# Patient Record
Sex: MALE | Race: WHITE | NOT HISPANIC OR LATINO | Employment: FULL TIME | URBAN - METROPOLITAN AREA
[De-identification: names, ages, dates, MRNs, and addresses within clinical notes are randomized per-mention and may not be internally consistent; named-entity substitution may affect disease eponyms.]

---

## 2019-04-03 ENCOUNTER — NON-PROVIDER VISIT (OUTPATIENT)
Dept: URGENT CARE | Facility: PHYSICIAN GROUP | Age: 54
End: 2019-04-03

## 2019-04-03 DIAGNOSIS — Z02.1 PRE-EMPLOYMENT DRUG SCREENING: ICD-10-CM

## 2019-04-03 LAB
AMP AMPHETAMINE: NORMAL
BAR BARBITURATES: NORMAL
BZO BENZODIAZEPINES: NORMAL
COC COCAINE: NORMAL
INT CON NEG: NORMAL
INT CON POS: NORMAL
MDMA ECSTASY: NORMAL
MET METHAMPHETAMINES: NORMAL
MTD METHADONE: NORMAL
OPI OPIATES: NORMAL
OXY OXYCODONE: NORMAL
PCP PHENCYCLIDINE: NORMAL
POC URINE DRUG SCREEN OCDRS: NEGATIVE
THC: NORMAL

## 2019-04-03 PROCEDURE — 80305 DRUG TEST PRSMV DIR OPT OBS: CPT | Performed by: FAMILY MEDICINE

## 2019-10-07 ENCOUNTER — OCCUPATIONAL MEDICINE (OUTPATIENT)
Dept: URGENT CARE | Facility: PHYSICIAN GROUP | Age: 54
End: 2019-10-07
Payer: COMMERCIAL

## 2019-10-07 VITALS
OXYGEN SATURATION: 97 % | HEIGHT: 70 IN | WEIGHT: 185 LBS | BODY MASS INDEX: 26.48 KG/M2 | TEMPERATURE: 97.7 F | HEART RATE: 82 BPM | DIASTOLIC BLOOD PRESSURE: 78 MMHG | SYSTOLIC BLOOD PRESSURE: 122 MMHG

## 2019-10-07 DIAGNOSIS — S39.012A STRAIN OF LUMBAR REGION, INITIAL ENCOUNTER: ICD-10-CM

## 2019-10-07 DIAGNOSIS — Z02.1 PRE-EMPLOYMENT DRUG SCREENING: ICD-10-CM

## 2019-10-07 LAB
AMP AMPHETAMINE: NORMAL
BAR BARBITURATES: NORMAL
BZO BENZODIAZEPINES: NORMAL
COC COCAINE: NORMAL
INT CON NEG: NEGATIVE
INT CON POS: POSITIVE
MDMA ECSTASY: NORMAL
MET METHAMPHETAMINES: NORMAL
MTD METHADONE: NORMAL
OPI OPIATES: NORMAL
OXY OXYCODONE: NORMAL
PCP PHENCYCLIDINE: NORMAL
POC URINE DRUG SCREEN OCDRS: NEGATIVE
THC: NORMAL

## 2019-10-07 PROCEDURE — 80305 DRUG TEST PRSMV DIR OPT OBS: CPT | Mod: 29 | Performed by: PHYSICIAN ASSISTANT

## 2019-10-07 PROCEDURE — 99203 OFFICE O/P NEW LOW 30 MIN: CPT | Mod: 29 | Performed by: PHYSICIAN ASSISTANT

## 2019-10-07 RX ORDER — METHYLPREDNISOLONE 4 MG/1
4 TABLET ORAL DAILY
Qty: 1 KIT | Refills: 0 | Status: ON HOLD | OUTPATIENT
Start: 2019-10-07 | End: 2020-05-26

## 2019-10-07 RX ORDER — CYCLOBENZAPRINE HCL 5 MG
5-10 TABLET ORAL
Qty: 10 TAB | Refills: 0 | Status: SHIPPED | OUTPATIENT
Start: 2019-10-07 | End: 2019-10-17

## 2019-10-07 ASSESSMENT — ENCOUNTER SYMPTOMS
TINGLING: 0
NECK PAIN: 0
FALLS: 0
MYALGIAS: 1
ABDOMINAL PAIN: 0
DOUBLE VISION: 0
BLURRED VISION: 0
HEADACHES: 0
LOSS OF CONSCIOUSNESS: 0
SENSORY CHANGE: 0
FOCAL WEAKNESS: 0
BACK PAIN: 1

## 2019-10-07 ASSESSMENT — PAIN SCALES - GENERAL: PAINLEVEL: 7=MODERATE-SEVERE PAIN

## 2019-10-07 NOTE — LETTER
"EMPLOYEE’S CLAIM FOR COMPENSATION/ REPORT OF INITIAL TREATMENT  FORM C-4    EMPLOYEE’S CLAIM - PROVIDE ALL INFORMATION REQUESTED   First Name  Devaughn Last Name  Perry Birthdate                    1965                Sex  male Claim Number   Home Address  [address] Age  54 y.o. Height  1.778 m (5' 10\") Weight  83.9 kg (185 lb) [facility]     [facility]  [address] Telephone  [phone] (home)    Mailing Address  [address] [facility] [place]  [address] Primary Language Spoken  English    Insurer Third Party   [employer]   Employee's Occupation (Job Title) When Injury or Occupational Disease Occurred      Employer's Name  [employer] Telephone  [phone]   Employer Address  [address]   [address]   Date of Injury  10/7/2019               Hour of Injury  7:05 AM Date Employer Notified  10/7/2019 Last Day of Work after Injury or Occupational Disease  10/7/2019 Supervisor to Whom Injury Reported  Yayo   Address or Location of Accident (if applicable)  [[address]; [place] [address]]   What were you doing at the time of accident? (if applicable)  Stacking waste cardboard    How did this injury or occupational disease occur? (Be specific an answer in detail. Use additional sheet if necessary)  For 45 minutes I had been stacking cardboard for disposal. I bend to  a piece, walked a few yards to stack it and bend to place it. This time a sharp pain occured in my lower back.   If you believe that you have an occupational disease, when did you first have knowledge of the disability and it relationship to your employment?  N/A Witnesses to the Accident  N/A      Nature of Injury or Occupational Disease  Workers' Compensation  Part(s) of Body Injured or Affected  Lower Back Area (Lumbar Area & Lumbo-Sacral), N/A, N/A    I certify that the above is " true and correct to the best of my knowledge and that I have provided this information in order to obtain the benefits of [place]  I hereby authorize any physician, chiropractor, surgeon, practitioner, or other person, any hospital, including [facility] or [facility], any medical service organization, any insurance company, or other institution or organization to release to each other, any medical or other information, including benefits paid or payable, pertinent to this injury or disease, except information relative to diagnosis, treatment and/or counseling for AIDS, psychological conditions, alcohol or controlled substances, for which I must give specific authorization.  A Photostat of this authorization shall be as valid as the original.     Date   Place   Employee’s Signature   THIS REPORT MUST BE COMPLETED AND MAILED WITHIN 3 WORKING DAYS OF TREATMENT   Place  [facility]  Name of Facility  [place]   Date  10/7/2019 Diagnosis  (S39.012A) Strain of lumbar region, initial encounter  (Z02.1) Pre-employment drug screening Is there evidence the injured employee was under the influence of alcohol and/or another controlled substance at the time of accident?   Hour  9:10 AM Description of Injury or Disease  Diagnoses of Strain of lumbar region, initial encounter and Pre-employment drug screening were pertinent to this visit. No   Treatment  Recommend rest, apply heat to affected area and perform gentle stretching as tolerated with pain.  Prescription for Flexeril and Medrol Dosepak given.  Flexeril is to be taken as needed before bed or not within 8 hours of operating a vehicle.  Do not take other NSAIDs while taking Medrol Dosepak.  Return for follow-up in 4 days, patient will schedule appointment on Friday, 10/11/2019.  Have you advised the patient to remain off work five days or  "more? No   X-Ray Findings      If Yes   From Date  To Date      From information given by the employee, together with medical evidence, can you directly connect this injury or occupational disease as job incurred?  Yes If No Full Duty  No Modified Duty  Yes   Is additional medical care by a physician indicated?  Yes If Modified Duty, Specify any Limitations / Restrictions  Avoid squatting, bending, pushing, pulling and reaching above head until follow-up appointment.  Recommend patient does not operate a vehicle for work.   Do you know of any previous injury or disease contributing to this condition or occupational disease?                                Date  10/7/2019 Print Doctor’s Name Natalie Cintron P.A.-C. I certify the employer’s copy of  this form was mailed on:   Address  [address] Insurer’s Use Only     [facility]  [address]    Provider’s Tax ID Number  [id number] Telephone  Dept: [phone]        e-SignMORRNATALIE SHARP P.A.-C.   e-Signature: Dr. Elías Lewis, Medical Director Degree  MD        ORIGINAL-TREATING PHYSICIAN OR CHIROPRACTOR    PAGE 2-INSURER/TPA    PAGE 3-EMPLOYER    PAGE 4-EMPLOYEE             Form C-4 (rev.10/07)              BRIEF DESCRIPTION OF RIGHTS AND BENEFITS  (Pursuant to [place] 616C.050)    Notice of Injury or Occupational Disease (Incident Report Form C-1): If an injury or occupational disease (OD) arises out of and in the course of employment, you must provide written notice to your employer as soon as practicable, but no later than 7 days after the accident or OD. Your employer shall maintain a sufficient supply of the required forms.    Claim for Compensation (Form C-4): If medical treatment is sought, the form C-4 is available at the place of initial treatment. A completed \"Claim for Compensation\" (Form C-4) must be filed within 90 days after an accident or OD. The treating physician or chiropractor must, within 3 working days after treatment, " complete and mail to the employer, the employer's insurer and third-party , the Claim for Compensation.    Medical Treatment: If you require medical treatment for your on-the-job injury or OD, you may be required to select a physician or chiropractor from a list provided by your workers’ compensation insurer, if it has contracted with an Organization for Managed Care (MCO) or Preferred Provider Organization (PPO) or providers of health care. If your employer has not entered into a contract with an MCO or PPO, you may select a physician or chiropractor from the Panel of Physicians and Chiropractors. Any medical costs related to your industrial injury or OD will be paid by your insurer.    Temporary Total Disability (TTD): If your doctor has certified that you are unable to work for a period of at least 5 consecutive days, or 5 cumulative days in a 20-day period, or places restrictions on you that your employer does not accommodate, you may be entitled to TTD compensation.    Temporary Partial Disability (TPD): If the wage you receive upon reemployment is less than the compensation for TTD to which you are entitled, the insurer may be required to pay you TPD compensation to make up the difference. TPD can only be paid for a maximum of 24 months.    Permanent Partial Disability (PPD): When your medical condition is stable and there is an indication of a PPD as a result of your injury or OD, within 30 days, your insurer must arrange for an evaluation by a rating physician or chiropractor to determine the degree of your PPD. The amount of your PPD award depends on the date of injury, the results of the PPD evaluation and your age and wage.    Permanent Total Disability (PTD): If you are medically certified by a treating physician or chiropractor as permanently and totally disabled and have been granted a PTD status by your insurer, you are entitled to receive monthly benefits not to exceed 66 2/3% of your  average monthly wage. The amount of your PTD payments is subject to reduction if you previously received a PPD award.    Vocational Rehabilitation Services: You may be eligible for vocational rehabilitation services if you are unable to return to the job due to a permanent physical impairment or permanent restrictions as a result of your injury or occupational disease.    Transportation and Per Aidan Reimbursement: You may be eligible for travel expenses and per aidan associated with medical treatment.    Reopening: You may be able to reopen your claim if your condition worsens after claim closure.    Appeal Process: If you disagree with a written determination issued by the insurer or the insurer does not respond to your request, you may appeal to the Department of Administration, , by following the instructions contained in your determination letter. You must appeal the determination within 70 days from the date of the determination letter at [address], [address], [place], [place] [address], or [address], [address], [place] [address]. If you disagree with the  decision, you may appeal to the Department of Administration, . You must file your appeal within 30 days from the date of the  decision letter at [address], [address], [place], [place] [address], or [address], [address], [place] [address]. If you disagree with a decision of an , you may file a petition for judicial review with the District Court. You must do so within 30 days of the Appeal Officer’s decision. You may be represented by an  at your own expense or you may contact the [facility] for possible representation.    [place]  for Injured Workers (NAIW): If you disagree with a  decision, you may request that NAIW represent you without charge at an  Hearing. For information regarding  denial of benefits, you may contact the [facility] at: [address], [address], [place] [address], [phone], or [address], [address], [place] [address], [phone]    To File a Complaint with the Division: If you wish to file a complaint with the  of the Division of Industrial Relations (DIR),  please contact the Workers’ Compensation Section, [address], [address], [place], [place] [address], telephone (950) [phone], or [address], [address], [place], [place] [address], telephone [phone]) [phone].    For assistance with Workers’ Compensation Issues: you may contact the Office of the Governor Consumer Health Assistance, [address], [address], [place], [place] [address], Toll Free 1-198.662.2533, Web site: http://govcha.[facility].nv., E-mail [email or web address].[email or web address].                             __________________________________________________________________                                                                   _________________                Employee Name / Signature                                                                                                                                                       Date                                                                                                                                                                                                     D-2 (rev. 06/18)

## 2019-10-11 ENCOUNTER — OCCUPATIONAL MEDICINE (OUTPATIENT)
Dept: URGENT CARE | Facility: PHYSICIAN GROUP | Age: 54
End: 2019-10-11
Payer: COMMERCIAL

## 2019-10-11 VITALS
TEMPERATURE: 97.8 F | DIASTOLIC BLOOD PRESSURE: 80 MMHG | HEIGHT: 70 IN | RESPIRATION RATE: 15 BRPM | SYSTOLIC BLOOD PRESSURE: 124 MMHG | WEIGHT: 185 LBS | BODY MASS INDEX: 26.48 KG/M2 | HEART RATE: 78 BPM | OXYGEN SATURATION: 98 %

## 2019-10-11 DIAGNOSIS — S39.012D STRAIN OF LUMBAR REGION, SUBSEQUENT ENCOUNTER: ICD-10-CM

## 2019-10-11 PROCEDURE — 99213 OFFICE O/P EST LOW 20 MIN: CPT | Performed by: FAMILY MEDICINE

## 2019-10-11 NOTE — LETTER
[facility]  [address]  [place] [address]  Phone:  [phone] - Fax:  [phone]   [facility] Progress Report and Disability Certification  Date of Service: 10/11/2019   No Show:  No  Date / Time of Next Visit: 10/16/2019   Claim Information   Patient Name: Devaughn Amador  Claim Number:     Employer:    Date of Injury: 10/7/2019     Insurer / TPA: [employer]  ID / SSN:     Occupation:   Diagnosis: The encounter diagnosis was Strain of lumbar region, subsequent encounter.    Medical Information   Related to Industrial Injury? Yes    Subjective Complaints:  DOI: 10/7/2019  F/u visit lumbar strain. He is improving at least 50% improved with muscle relaxer and steroid. Pain severity 5/10 currently. +radiation to left leg. No myelopathy. No fever. No PMH cancer, no unwanted weight loss. Worse with bending. No other aggravating or alleviating factors.     Objective Findings: Back: tender left paralumbar musculature and soft tissue, pain reproduced with flexion at 45 degrees. No midline bony tenderness or stepoff  Neuro: Bilateral lower extremity strength and sensory intact.  Negative straight leg raise. DTR 3+/4 bilateral knees   Pre-Existing Condition(s):     Assessment:   Condition Improved    Status: Additional Care Required  Permanent Disability:No    Plan:   Comments:advance duty restrictions slightly, ice, nsaid    Diagnostics:      Comments:       Disability Information   Status: Released to Restricted Duty    From:  10/11/2019  Through: 10/16/2019 Restrictions are: Temporary   Physical Restrictions   Sitting:    Standing:    Stooping:  < or = to 1 hr/day Bending:  < or = to 1 hr/day   Squatting:    Walking:    Climbing:    Pushing:  < or = to 1 hr/day   Pulling:  < or = to 1 hr/day Other:    Reaching Above Shoulder (L):   Reaching Above Shoulder (R):       Reaching Below Shoulder (L):    Reaching Below Shoulder (R):      Not to exceed Weight  Limits   Carrying(hrs): 1 Weight Limit(lb): < or = to 10 pounds Lifting(hrs): 1 Weight  Limit(lb): < or = to 10 pounds   Comments:      Repetitive Actions   Hands: i.e. Fine Manipulations from Grasping:     Feet: i.e. Operating Foot Controls:     Driving / Operate Machinery:     Physician Name: Jreemias Fernandez M.D. Physician Signature: JEREMIAS Holliday M.D. e-Signature: Dr. Elías Lewis, Medical Director   Clinic Name / Location: [address] [address] Clinic Phone Number: Dept: [phone]   Appointment Time: 2:00 Pm Visit Start Time: 1:54 PM   Check-In Time:  1:36 Pm Visit Discharge Time:  2:20 PM   Original-Treating Physician or Chiropractor    Page 2-Insurer/TPA    Page 3-Employer    Page 4-Employee

## 2019-10-16 ENCOUNTER — OCCUPATIONAL MEDICINE (OUTPATIENT)
Dept: URGENT CARE | Facility: PHYSICIAN GROUP | Age: 54
End: 2019-10-16
Payer: COMMERCIAL

## 2019-10-16 VITALS
DIASTOLIC BLOOD PRESSURE: 80 MMHG | SYSTOLIC BLOOD PRESSURE: 122 MMHG | WEIGHT: 180 LBS | RESPIRATION RATE: 12 BRPM | BODY MASS INDEX: 25.77 KG/M2 | HEART RATE: 90 BPM | HEIGHT: 70 IN | TEMPERATURE: 99.4 F | OXYGEN SATURATION: 95 %

## 2019-10-16 DIAGNOSIS — S39.012A STRAIN OF LUMBAR REGION, INITIAL ENCOUNTER: ICD-10-CM

## 2019-10-16 PROCEDURE — 99213 OFFICE O/P EST LOW 20 MIN: CPT | Performed by: FAMILY MEDICINE

## 2019-10-16 NOTE — LETTER
"EMPLOYEE’S CLAIM FOR COMPENSATION/ REPORT OF INITIAL TREATMENT  FORM C-4    EMPLOYEE’S CLAIM - PROVIDE ALL INFORMATION REQUESTED   First Name  Devaughn Last Name  Perry Birthdate                    1965                Sex  male Claim Number   Home Address  [address] Age  54 y.o. Height  1.778 m (5' 10\") Weight  81.6 kg (180 lb) [facility]     [facility] Zip  [address] Telephone  [phone] (home)    Mailing Address  [address] [facility] Zip  [address] Primary Language Spoken  English    Insurer  [employer] Third Party   [employer]   Employee's Occupation (Job Title) When Injury or Occupational Disease Occurred      Employer's Name  [employer]  Telephone   [phone]   Employer Address   [address]  [facility]   [address]   Date of Injury  10/7/2019               Hour of Injury  7:05 AM Date Employer Notified  10/7/2019 Last Day of Work after Injury or Occupational Disease  10/7/2019 Supervisor to Whom Injury Reported  Yayo   Address or Location of Accident (if applicable)  [[address]; [place] [address]]   What were you doing at the time of accident? (if applicable)  Stacking waste cardboard    How did this injury or occupational disease occur? (Be specific an answer in detail. Use additional sheet if necessary)  For 45 minutes I had been stacking cardboard for disposal. I bend to  a piece, walked a few yards to stack it and bend to place it. This time a sharp pain occured in my lower back.   If you believe that you have an occupational disease, when did you first have knowledge of the disability and it relationship to your employment?  N/A Witnesses to the Accident  N/A      Nature of Injury or Occupational Disease  Workers' Compensation  Part(s) of Body Injured or Affected  Lower Back Area (Lumbar Area & Lumbo-Sacral), N/A, N/A    I " certify that the above is true and correct to the best of my knowledge and that I have provided this information in order to obtain the benefits of [place]  I hereby authorize any physician, chiropractor, surgeon, practitioner, or other person, any hospital, including [facility] or [facility], any medical service organization, any insurance company, or other institution or organization to release to each other, any medical or other information, including benefits paid or payable, pertinent to this injury or disease, except information relative to diagnosis, treatment and/or counseling for AIDS, psychological conditions, alcohol or controlled substances, for which I must give specific authorization.  A Photostat of this authorization shall be as valid as the original.     Date   Place   Employee’s Signature   THIS REPORT MUST BE COMPLETED AND MAILED WITHIN 3 WORKING DAYS OF TREATMENT   Place  [facility]  Name of Facility  [place]   Date  10/16/2019 Diagnosis  (S39.012A) Strain of lumbar region, initial encounter Is there evidence the injured employee was under the influence of alcohol and/or another controlled substance at the time of accident?   Hour  2:15 PM Description of Injury or Disease  The encounter diagnosis was Strain of lumbar region, initial encounter.     Treatment     Have you advised the patient to remain off work five days or more?     X-Ray Findings      If Yes   From Date  To Date      From information given by the employee, together with medical evidence, can you directly connect this injury or occupational disease as job incurred?    If No Full Duty    Modified Duty      Is additional medical care by a physician indicated?    If Modified Duty, Specify any Limitations / Restrictions      Do you know of any previous injury or disease contributing to this  "condition or occupational disease?                                Date  10/16/2019 Print Doctor’s Name Everette Reardon M.D. I certify the employer’s copy of  this form was mailed on:   Address  [address] Insurer’s Use Only     [facility] Zip  [address]    Provider’s Tax ID Number  [id number] Telephone  Dept: [phone]        e-EVERETTE Hong M.D.   e-Signature: Dr. Elías Lewis, Medical Director Degree  MD        ORIGINAL-TREATING PHYSICIAN OR CHIROPRACTOR    PAGE 2-INSURER/TPA    PAGE 3-EMPLOYER    PAGE 4-EMPLOYEE             Form C-4 (rev.10/07)              BRIEF DESCRIPTION OF RIGHTS AND BENEFITS  (Pursuant to NRS 616C.050)    Notice of Injury or Occupational Disease (Incident Report Form C-1): If an injury or occupational disease (OD) arises out of and in the course of employment, you must provide written notice to your employer as soon as practicable, but no later than 7 days after the accident or OD. Your employer shall maintain a sufficient supply of the required forms.    Claim for Compensation (Form C-4): If medical treatment is sought, the form C-4 is available at the place of initial treatment. A completed \"Claim for Compensation\" (Form C-4) must be filed within 90 days after an accident or OD. The treating physician or chiropractor must, within 3 working days after treatment, complete and mail to the employer, the employer's insurer and third-party , the Claim for Compensation.    Medical Treatment: If you require medical treatment for your on-the-job injury or OD, you may be required to select a physician or chiropractor from a list provided by your workers’ compensation insurer, if it has contracted with an Organization for Managed Care (MCO) or Preferred Provider Organization (PPO) or providers of health care. If your employer has not entered into a contract with an MCO or PPO, you may select a physician or chiropractor from the Panel of Physicians and " Chiropractors. Any medical costs related to your industrial injury or OD will be paid by your insurer.    Temporary Total Disability (TTD): If your doctor has certified that you are unable to work for a period of at least 5 consecutive days, or 5 cumulative days in a 20-day period, or places restrictions on you that your employer does not accommodate, you may be entitled to TTD compensation.    Temporary Partial Disability (TPD): If the wage you receive upon reemployment is less than the compensation for TTD to which you are entitled, the insurer may be required to pay you TPD compensation to make up the difference. TPD can only be paid for a maximum of 24 months.    Permanent Partial Disability (PPD): When your medical condition is stable and there is an indication of a PPD as a result of your injury or OD, within 30 days, your insurer must arrange for an evaluation by a rating physician or chiropractor to determine the degree of your PPD. The amount of your PPD award depends on the date of injury, the results of the PPD evaluation and your age and wage.    Permanent Total Disability (PTD): If you are medically certified by a treating physician or chiropractor as permanently and totally disabled and have been granted a PTD status by your insurer, you are entitled to receive monthly benefits not to exceed 66 2/3% of your average monthly wage. The amount of your PTD payments is subject to reduction if you previously received a PPD award.    Vocational Rehabilitation Services: You may be eligible for vocational rehabilitation services if you are unable to return to the job due to a permanent physical impairment or permanent restrictions as a result of your injury or occupational disease.    Transportation and Per Aidan Reimbursement: You may be eligible for travel expenses and per aidan associated with medical treatment.    Reopening: You may be able to reopen your claim if your condition worsens after claim  closure.    Appeal Process: If you disagree with a written determination issued by the insurer or the insurer does not respond to your request, you may appeal to the Department of Administration, , by following the instructions contained in your determination letter. You must appeal the determination within 70 days from the date of the determination letter at [address], [address], [place], [place] [address], or [address], [address], [place], [place] [address]. If you disagree with the  decision, you may appeal to the Department of Administration, . You must file your appeal within 30 days from the date of the  decision letter at [address], [address], [place], [place] [address], or [address], [address], [place], [place] [address]. If you disagree with a decision of an , you may file a petition for judicial review with the District Court. You must do so within 30 days of the Appeal Officer’s decision. You may be represented by an  at your own expense or you may contact the [facility] for possible representation.    [place]  for Injured Workers (NAIW): If you disagree with a  decision, you may request that NAIW represent you without charge at an  Hearing. For information regarding denial of benefits, you may contact the [facility] at: [address], [address], [place] [address], [phone], or [address], [address], [place] [address], [phone]    To File a Complaint with the Division: If you wish to file a complaint with the  of the Division of Industrial Relations (DIR),  please contact the Workers’ Compensation Section, [address], [address], [place], [place] [address], telephone (890) [phone], or [address], [place], [place] [address], telephone [phone].    For assistance with Workers’  Compensation Issues: You may contact the Office of the Governor Consumer Health Assistance, [facility], [address], [place] [address], Toll Free 1-260.835.3242, Web site: http://govcha.[email or web address].[email or web address]., E-mail [email or web address].                             __________________________________________________________________                                                                   _________________                Employee Name / Signature                                                                                                                                                       Date                                                                                                                                                                                                     D-2 (rev. 06/18)

## 2019-10-16 NOTE — LETTER
[facility]  [address]  [place] [address]  Phone:  [phone] - Fax:  [phone]   [facility] Progress Report and Disability Certification  Date of Service: 10/16/2019   No Show:  No  Date / Time of Next Visit: 10/23/2019   Claim Information   Patient Name: Devaughn Amador  Claim Number:     Employer: [employer] Date of Injury: 10/7/2019     Insurer / TPA: [employer]  ID / SSN:     Occupation:   Diagnosis: The encounter diagnosis was Strain of lumbar region, initial encounter.    Medical Information   Related to Industrial Injury? 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   He continues to have the lower back pain, but now radiating down rt leg.   Mild improvement with motrin     . Pertinent negatives include no bladder incontinence, bowel incontinence, dysuria, fever, headaches,  or weakness.     Objective Findings:      Lumbar spine: pt exhibits decreased range of motion, spasm and right sided tenderness . Pt exhibits no bony tenderness, no swelling, no edema, no deformity  .   Neurological: patient is alert and oriented to person, place, and time. Patient has normal reflexes. No cranial nerve deficit. Patient exhibits normal muscle tone.      STRAIGHT LEG RAISE NEGATIVE ON LEFT/RIGHT   Pre-Existing Condition(s):     Assessment:   Condition Worsened    Status: Additional Care Required  Permanent Disability:No    Plan:      Diagnostics:      Comments:       Disability Information   Status: Released to Restricted Duty    From:  10/16/2019  Through: 10/23/2019 Restrictions are: Temporary   Physical Restrictions   Sitting:    Standing:    Stoopin hrs/day Bendin hrs/day   Squatting:    Walking:    Climbing:    Pushing:      Pulling:    Other:    Reaching Above Shoulder (L):   Reaching Above Shoulder (R):       Reaching Below  Shoulder (L):    Reaching Below Shoulder (R):      Not to exceed Weight Limits   Carrying(hrs):   Weight Limit(lb): < or = to 10 pounds Lifting(hrs):   Weight  Limit(lb): < or = to 10 pounds   Comments: Strain of lumbar region, initial encounter  Worsening.   Now has radicular symptoms.   Restrictions per D39  F/u in occupational medicine in 2-5 d    - Diclofenac Sodium (VOLTAREN) 1 % Gel; Apply 4 g to skin as directed 3 times a day as needed.  Dispense: 1 Tube; Refill: 0      Repetitive Actions   Hands: i.e. Fine Manipulations from Grasping:     Feet: i.e. Operating Foot Controls:     Driving / Operate Machinery:     Physician Name: Everette Reardon M.D. Physician Signature: EVERETTE Garza M.D. e-Signature: Dr. Elías Lewis, Medical Director   Clinic Name / Location: [address] [address] Clinic Phone Number: Dept: [phone]   Appointment Time: 2:00 Pm Visit Start Time: 2:15 PM   Check-In Time:  1:44 Pm Visit Discharge Time:  3:19PM   Original-Treating Physician or Chiropractor    Page 2-Insurer/TPA    Page 3-Employer    Page 4-Employee

## 2019-10-16 NOTE — PROGRESS NOTES
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   He continues to have the lower back pain, but now radiating down rt leg.   Mild improvement with motrin     . Pertinent negatives include no bladder incontinence, bowel incontinence, dysuria, fever, headaches,  or weakness.       Social History     Tobacco Use   • Smoking status: Current Some Day Smoker     Types: Cigars   • Smokeless tobacco: Never Used   Substance Use Topics   • Alcohol use: Not on file   • Drug use: Not on file             Review of Systems   Constitutional: Negative for fever, chills and malaise/fatigue.   Eyes: Negative for vision changes, d/c.    Respiratory: Negative for cough and sputum production.    Cardiovascular: Negative for chest pain and palpitations.   Gastrointestinal: Negative for nausea, vomiting, abdominal pain, diarrhea and constipation.   Genitourinary: Negative for dysuria, urgency and frequency.   Skin: Negative for rash or  itching.   Neurological: Negative for dizziness and headaches.   Psychiatric/Behavioral: Negative for depression.   Hematologic/lymphatic - denies bruising or excessive bleeding  All other systems reviewed and are negative.         Objective:     /80   Pulse 90   Temp 37.4 °C (99.4 °F) (Temporal)   Resp 12   Ht 1.778 m (5' 10\")   Wt 81.6 kg (180 lb)   SpO2 95%       Physical Exam   Constitutional: pt is oriented to person, place, and time. Pt appears well-developed and well-nourished. No distress.   HENT:   Head: Normocephalic and atraumatic.   Eyes: EOM are normal. Pupils are equal, round, and reactive to light. No scleral icterus.   Neck: Normal range of motion. Neck supple. No thyromegaly present.   Cardiovascular: Normal rate, regular rhythm and normal heart sounds.    Pulmonary/Chest: Effort normal and breath sounds normal. No respiratory " distress.  no wheezes.   no rales.  no tenderness.   Musculoskeletal: pt exhibits no edema.                  Lumbar spine: pt exhibits decreased range of motion, spasm and right sided tenderness . Pt exhibits no bony tenderness, no swelling, no edema, no deformity  .   Neurological: patient is alert and oriented to person, place, and time. Patient has normal reflexes. No cranial nerve deficit. Patient exhibits normal muscle tone.      STRAIGHT LEG RAISE NEGATIVE ON LEFT/RIGHT  Skin: Skin is warm and dry. No rash noted. No erythema.   Psychiatric: patient has a normal mood and affect.  behavior is normal.   Nursing note and vitals reviewed.               Assessment/Plan:       1. Strain of lumbar region, initial encounter  Worsening.   Now has radicular symptoms.   Restrictions per D39  F/u in occupational medicine in 2-5 d    - Diclofenac Sodium (VOLTAREN) 1 % Gel; Apply 4 g to skin as directed 3 times a day as needed.  Dispense: 1 Tube; Refill: 0

## 2019-10-17 ENCOUNTER — OCCUPATIONAL MEDICINE (OUTPATIENT)
Dept: OCCUPATIONAL MEDICINE | Facility: CLINIC | Age: 54
End: 2019-10-17
Payer: COMMERCIAL

## 2019-10-17 VITALS
DIASTOLIC BLOOD PRESSURE: 78 MMHG | HEART RATE: 85 BPM | HEIGHT: 70 IN | SYSTOLIC BLOOD PRESSURE: 110 MMHG | WEIGHT: 180 LBS | BODY MASS INDEX: 25.77 KG/M2 | TEMPERATURE: 98.5 F | OXYGEN SATURATION: 95 %

## 2019-10-17 DIAGNOSIS — S39.012D STRAIN OF LUMBAR REGION, SUBSEQUENT ENCOUNTER: ICD-10-CM

## 2019-10-17 PROCEDURE — 99213 OFFICE O/P EST LOW 20 MIN: CPT | Mod: 29 | Performed by: NURSE PRACTITIONER

## 2019-10-17 RX ORDER — TIZANIDINE 4 MG/1
4 TABLET ORAL EVERY 6 HOURS PRN
Qty: 30 TAB | Refills: 0 | Status: SHIPPED | OUTPATIENT
Start: 2019-10-17 | End: 2020-06-05

## 2019-10-17 RX ORDER — IBUPROFEN 800 MG/1
800 TABLET ORAL EVERY 8 HOURS PRN
Qty: 30 TAB | Refills: 0 | Status: ON HOLD | OUTPATIENT
Start: 2019-10-17 | End: 2020-05-26

## 2019-10-17 ASSESSMENT — ENCOUNTER SYMPTOMS
MYALGIAS: 1
BACK PAIN: 1
PSYCHIATRIC NEGATIVE: 1
SENSORY CHANGE: 1
RESPIRATORY NEGATIVE: 1
TINGLING: 0
WEAKNESS: 1
CONSTITUTIONAL NEGATIVE: 1
CARDIOVASCULAR NEGATIVE: 1

## 2019-10-17 NOTE — LETTER
[address],   [address] [place] [address]  Phone:  [phone] - Fax:  [phone]   Occupational Health [facility] Progress Report and Disability Certification  Date of Service: 10/17/2019   No Show:  No  Date / Time of Next Visit: 10/31/2019 @ 1:30 AM   Claim Information   Patient Name: Devaughn Amador  Claim Number:     Employer:   [employer] Date of Injury: 10/7/2019     Insurer / TPA: [employer]  ID / SSN:     Occupation:   Diagnosis: The encounter diagnosis was Strain of lumbar region, subsequent encounter.    Medical Information   Related to Industrial Injury?   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  He states he felt a sudden sharp and shooting pain in his lower back that radiates to mid back.     Today patient states pain has gotten worse. Patient cannot sit or stand for more than 1-2 hours. Patient states that he been using heat with moderate relief. Patient is taking Ibuprofen with some relief. Patient states that he used Diclofenac gel yesterday which did nothing. Patient completed his complete Medrol Dosepak which did reduce some of the pain, but pain is now back. MRI has been ordered in the Urgent Care, states that he has severe pain. Patient has been trying to stretch his back to keep it moving. Pain is described as constant, migrating down to his leg, but not to the toes, feet, or ankle, and its stuck in the calf.  Patient denies saddle anesthesia, loss of bowel/bladder control, or tingling. Plan of care discussed with patient.   Objective Findings: Lumbar:  Moderate bony tenderness over lumbar spine region with moderate paraspinous muscular tenderness and spasm bilateral lumbar region  No bony deformity or step-off noted no radiculopathy  Unable to perform straight leg test due to pain  Patient is able to  ambulate with a cane  Obvious difficulty getting up and down from the exam secondary to pain  Distal neurovascular intact  Unable to heel/toe walk with minimal difficulty   Cranial nerves grossly intact   Achilles and patellar reflexes 2+ bilaterally   Pre-Existing Condition(s):     Assessment:   Condition Worsened    Status: Additional Care Required  Permanent Disability:No    Plan: Diagnostics    Diagnostics: MRI    Comments:  Follow-up in 2 weeks  MRI ordered in Urgent Care  Continue with stretching and gentle range of motion as tolerated  Continue with Ibuprofen and tizanidine as prescribed. DO NOT DRIVE/WORK on muscle relaxer  Continue with heat as tolerated     Disability Information   Status: Released to Restricted Duty    From:  10/17/2019  Through: 10/31/2019 Restrictions are: Temporary   Physical Restrictions   Sitting:    Standing:    Stooping:  < or = to 2 hrs/day Bending:  < or = to 2 hrs/day   Squatting:    Walking:    Climbing:    Pushing:  < or = to 2 hrs/day   Pulling:  < or = to 2 hrs/day Other:    Reaching Above Shoulder (L):   Reaching Above Shoulder (R):       Reaching Below Shoulder (L):    Reaching Below Shoulder (R):      Not to exceed Weight Limits   Carrying(hrs):   Weight Limit(lb): < or = to 10 pounds Lifting(hrs):   Weight  Limit(lb): < or = to 10 pounds   Comments:      Repetitive Actions   Hands: i.e. Fine Manipulations from Grasping:     Feet: i.e. Operating Foot Controls:     Driving / Operate Machinery: < or = to 2 hrs/day   Physician Name: DEEP Byrnes Physician Signature: CARMEN Posey e-Signature: Dr. Elías Lewis, Medical Director   Clinic Name / Location: [address],   [address]  [place] [address] Clinic Phone Number: Dept: [phone]   Appointment Time: 1:30 Pm Visit Start Time: 1:48 PM   Check-In Time:  1:46 Pm Visit Discharge Time:  3 PM   Original-Treating Physician or Chiropractor    Page  2-Insurer/TPA    Page 3-Employer    Page 4-Employee

## 2019-10-17 NOTE — PROGRESS NOTES
"Subjective:      Devaughn Amador is a 54 y.o. 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  He states he felt a sudden sharp and shooting pain in his lower back that radiates to mid back.     Today patient states pain has gotten worse. Patient cannot sit or stand for more than 1-2 hours. Patient states that he been using heat with moderate relief. Patient is taking Ibuprofen with some relief. Patient states that he used Diclofenac gel yesterday which did nothing. Patient completed his complete Medrol Dosepak which did reduce some of the pain, but pain is now back. MRI has been ordered in the Urgent Care, states that he has severe pain. Patient has been trying to stretch his back to keep it moving. Pain is described as constant, migrating down to his leg, but not to the toes, feet, or ankle, and its stuck in the calf.  Patient denies saddle anesthesia, loss of bowel/bladder control, or tingling. Plan of care discussed with patient.     HPI    Review of Systems   Constitutional: Negative.    Respiratory: Negative.    Cardiovascular: Negative.    Musculoskeletal: Positive for back pain, joint pain and myalgias.   Skin: Negative.    Neurological: Positive for sensory change and weakness. Negative for tingling.   Psychiatric/Behavioral: Negative.         ROS: All systems were reviewed on intake form, form was reviewed and signed. See scanned documents in media. Pertinent positives and negatives included in HPI.    PMH: No pertinent past medical history to this problem  MEDS: Medications were reviewed in Epic  ALLERGIES:   Allergies   Allergen Reactions   • Morphine      Not an allergy, \"not having control\"     SOCHX: Works as  at [employer]  FH: No pertinent family history to this problem       Objective:     /78   Pulse 85   Temp 36.9 °C " "(98.5 °F)   Ht 1.778 m (5' 10\")   Wt 81.6 kg (180 lb)   SpO2 95%   BMI 25.83 kg/m²      Physical Exam   Constitutional: He is oriented to person, place, and time. He appears well-developed and well-nourished.   Cardiovascular: Normal rate and regular rhythm.   Pulmonary/Chest: Effort normal.   Musculoskeletal: He exhibits tenderness. He exhibits no edema or deformity.        Lumbar back: He exhibits decreased range of motion, tenderness, pain and spasm. He exhibits no swelling, no edema, no deformity and normal pulse.        Back:    Neurological: He is alert and oriented to person, place, and time.   Skin: Skin is warm and dry. Capillary refill takes less than 2 seconds. No erythema.   Psychiatric: He has a normal mood and affect. His behavior is normal.       Lumbar:  Moderate bony tenderness over lumbar spine region with moderate paraspinous muscular tenderness and spasm bilateral lumbar region  No bony deformity or step-off noted no radiculopathy  Unable to perform straight leg test due to pain  Patient is able to ambulate with a cane  Obvious difficulty getting up and down from the exam secondary to pain  Distal neurovascular intact  Unable to heel/toe walk with minimal difficulty   Cranial nerves grossly intact   Achilles and patellar reflexes 2+ bilaterally       Assessment/Plan:     1. Strain of lumbar region, subsequent encounter    - ibuprofen (MOTRIN) 800 MG Tab; Take 1 Tab by mouth every 8 hours as needed.  Dispense: 30 Tab; Refill: 0  - tizanidine (ZANAFLEX) 4 MG Tab; Take 1 Tab by mouth every 6 hours as needed.  Dispense: 30 Tab; Refill: 0    Follow-up in 2 weeks  MRI ordered in Urgent Care  Continue with stretching and gentle range of motion as tolerated  Continue with Ibuprofen and tizanidine as prescribed. DO NOT DRIVE/WORK on muscle relaxer  Continue with heat as tolerated     "

## 2019-10-22 ASSESSMENT — ENCOUNTER SYMPTOMS
FLANK PAIN: 0
FOCAL WEAKNESS: 0
FEVER: 0
SENSORY CHANGE: 0
CHILLS: 0
NECK PAIN: 0

## 2019-10-22 NOTE — PROGRESS NOTES
"Subjective:      Devaughn Amador is a 54 y.o. male who presents with Follow-Up ()      DOI: 10/7/2019  F/u visit lumbar strain. He is improving at least 50% improved with muscle relaxer and steroid. Pain severity 5/10 currently. +radiation to left leg. No myelopathy. No fever. No PMH cancer, no unwanted weight loss. Worse with bending. No other aggravating or alleviating factors.       HPI    Review of Systems   Constitutional: Negative for chills and fever.   Genitourinary: Negative for flank pain and hematuria.   Musculoskeletal: Negative for neck pain.   Skin: Negative for rash.   Neurological: Negative for sensory change and focal weakness.          Objective:     /80   Pulse 78   Temp 36.6 °C (97.8 °F)   Resp 15   Ht 1.778 m (5' 10\")   Wt 83.9 kg (185 lb)   SpO2 98%   BMI 26.54 kg/m²      Physical Exam   Constitutional: He is oriented to person, place, and time. He appears well-developed and well-nourished. No distress.   HENT:   Head: Normocephalic and atraumatic.   Neck: Normal range of motion. Neck supple.   Neurological: He is alert and oriented to person, place, and time.   Skin: Skin is warm and dry. No rash noted.       Back: tender left paralumbar musculature and soft tissue, pain reproduced with flexion at 45 degrees. No midline bony tenderness or stepoff  Neuro: Bilateral lower extremity strength and sensory intact.  Negative straight leg raise. DTR 3+/4 bilateral knees       Assessment/Plan:     1. Strain of lumbar region, subsequent encounter       Differential diagnosis, natural history, supportive care, and indications for immediate follow-up discussed at length.     Continue current treatment and light duty on D 39.  Follow-up in 5 days.  "

## 2019-10-31 ENCOUNTER — OCCUPATIONAL MEDICINE (OUTPATIENT)
Dept: OCCUPATIONAL MEDICINE | Facility: CLINIC | Age: 54
End: 2019-10-31
Payer: COMMERCIAL

## 2019-10-31 VITALS
BODY MASS INDEX: 25.77 KG/M2 | HEART RATE: 104 BPM | TEMPERATURE: 98.5 F | RESPIRATION RATE: 14 BRPM | WEIGHT: 180 LBS | HEIGHT: 70 IN

## 2019-10-31 DIAGNOSIS — S39.012D LUMBAR STRAIN, SUBSEQUENT ENCOUNTER: ICD-10-CM

## 2019-10-31 PROCEDURE — 99213 OFFICE O/P EST LOW 20 MIN: CPT | Mod: 29 | Performed by: NURSE PRACTITIONER

## 2019-10-31 ASSESSMENT — ENCOUNTER SYMPTOMS
MYALGIAS: 1
RESPIRATORY NEGATIVE: 1
WEAKNESS: 0
CARDIOVASCULAR NEGATIVE: 1
CONSTITUTIONAL NEGATIVE: 1
BACK PAIN: 1
SENSORY CHANGE: 1
TINGLING: 0
PSYCHIATRIC NEGATIVE: 1

## 2019-10-31 NOTE — LETTER
[address],   [address] [place] [address]  Phone:  [phone] - Fax:  [phone]   [facility] Progress Report and Disability Certification  Date of Service: 10/31/2019   No Show:  No  Date / Time of Next Visit: 11/14/2019 @ 1:30 PM   Claim Information   Patient Name: Devaughn Amador  Claim Number:     Employer:   [email or web address] Date of Injury: 10/7/2019     Insurer / TPA: [employer]  ID / SSN:     Occupation:   Diagnosis: The encounter diagnosis was Lumbar strain, subsequent encounter.    Medical Information   Related to Industrial Injury?   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  He states he felt a sudden sharp and shooting pain in his lower back that radiates to mid back.      Today patient states some mild improvement. Patient cannot sit or stand for more than 1-2 hours. Patient states that he been using heat with moderate relief. Patient is taking Ibuprofen with some relief.  Patient states that he thinks that it appears that his MRI is being denied. Patient has been trying to stretch his back to keep it moving, which provides mild relief. Pain is described as constant, migrating down to his leg, but not to the toes, feet, or ankle, and its stuck in the calf.  Patient denies saddle anesthesia, loss of bowel/bladder control, or tingling. Patient states that he feels better with more rest and is able to gauge how he is feeling and take breaks as needed. Plan of care discussed with patient.    Objective Findings: Lumbar:  Moderate bony tenderness over lumbar spine region with moderate paraspinous muscular tenderness and spasm bilateral lumbar region  No bony deformity or step-off noted no radiculopathy  Unable to perform straight leg test due to pain  Patient is able to ambulate with a cane  Improved  ability getting up and down from sitting position  Distal neurovascular intact  Unable to heel/toe walk with minimal difficulty   Cranial nerves grossly intact   Achilles and patellar reflexes 2+ bilaterally   Pre-Existing Condition(s):     Assessment:   Condition Same    Status: Additional Care Required  Permanent Disability:No    Plan: PT    Diagnostics:      Comments:  Follow-up in 2 weeks  Work restrictions applied  Continue with ice and heat as tolerated  Continue with OTC ibuprofen as needed  Continue with gentle range of motion and stretching exercises as tolerated  Physical therapy referral placed    Disability Information   Status: Released to Restricted Duty    From:  10/31/2019  Through: 11/14/2019 Restrictions are: Temporary   Physical Restrictions   Sitting:    Standing:    Stooping:  < or = to 2 hrs/day Bending:  < or = to 2 hrs/day   Squatting:    Walking:    Climbing:    Pushing:  < or = to 2 hrs/day   Pulling:  < or = to 2 hrs/day Other:    Reaching Above Shoulder (L):   Reaching Above Shoulder (R):       Reaching Below Shoulder (L):    Reaching Below Shoulder (R):      Not to exceed Weight Limits   Carrying(hrs):   Weight Limit(lb): < or = to 10 pounds Lifting(hrs):   Weight  Limit(lb): < or = to 10 pounds   Comments:      Repetitive Actions   Hands: i.e. Fine Manipulations from Grasping:     Feet: i.e. Operating Foot Controls:     Driving / Operate Machinery: < or = to 2 hrs/day   Physician Name: DEEP Byrnes Physician Signature:   e-Signature: Dr. Elías Lewis, Medical Director   Clinic Name / Location: [address],   [address] [place] [address] Clinic Phone Number: Dept: [phone]   Appointment Time: 1:30 Pm Visit Start Time: 1:24 PM   Check-In Time:  1:21 Pm Visit Discharge Time: 2 PM   Original-Treating Physician or Chiropractor    Page 2-Insurer/TPA    Page 3-Employer    Page 4-Employee

## 2019-10-31 NOTE — PROGRESS NOTES
"Subjective:      Devaughn Amador is a 54 y.o. 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  He states he felt a sudden sharp and shooting pain in his lower back that radiates to mid back.      Today patient states some mild improvement. Patient cannot sit or stand for more than 1-2 hours. Patient states that he been using heat with moderate relief. Patient is taking Ibuprofen with some relief.  Patient states that he thinks that it appears that his MRI is being denied. Patient has been trying to stretch his back to keep it moving, which provides mild relief. Pain is described as constant, migrating down to his leg, but not to the toes, feet, or ankle, and its stuck in the calf.  Patient denies saddle anesthesia, loss of bowel/bladder control, or tingling. Patient states that he feels better with more rest and is able to gauge how he is feeling and take breaks as needed. Plan of care discussed with patient.      HPI    Review of Systems   Constitutional: Negative.    Respiratory: Negative.    Cardiovascular: Negative.    Musculoskeletal: Positive for back pain and myalgias.   Skin: Negative.    Neurological: Positive for sensory change. Negative for tingling and weakness.   Psychiatric/Behavioral: Negative.         SOCHX: Works as a  at [employer]  FH: No pertinent family history to this problem.         Objective:     Pulse (!) 104   Temp 36.9 °C (98.5 °F) (Temporal)   Resp 14   Ht 1.778 m (5' 10\")   Wt 81.6 kg (180 lb)   BMI 25.83 kg/m²      Physical Exam   Constitutional: He is oriented to person, place, and time. He appears well-developed and well-nourished.   Cardiovascular: Normal rate and regular rhythm.   Pulmonary/Chest: Effort normal.   Musculoskeletal: He exhibits tenderness. He exhibits no edema or deformity.        Lumbar back: He exhibits " decreased range of motion, tenderness, bony tenderness and pain. He exhibits no swelling, no edema, no deformity, no spasm and normal pulse.        Back:    Neurological: He is alert and oriented to person, place, and time.   Skin: Skin is warm and dry. Capillary refill takes less than 2 seconds. No erythema.   Psychiatric: He has a normal mood and affect. His behavior is normal.       Lumbar:  Moderate bony tenderness over lumbar spine region with moderate paraspinous muscular tenderness and spasm bilateral lumbar region  No bony deformity or step-off noted no radiculopathy  Unable to perform straight leg test due to pain  Patient is able to ambulate with a cane  Improved ability getting up and down from sitting position  Distal neurovascular intact  Unable to heel/toe walk with minimal difficulty   Cranial nerves grossly intact   Achilles and patellar reflexes 2+ bilaterally       Assessment/Plan:     1. Lumbar strain, subsequent encounter    - REFERRAL TO PHYSICAL THERAPY Reason for Therapy: Eval/Treat/Report    Follow-up in 2 weeks  Work restrictions applied  Continue with ice and heat as tolerated  Continue with OTC ibuprofen as needed  Continue with gentle range of motion and stretching exercises as tolerated  Physical therapy referral placed

## 2019-11-14 ENCOUNTER — OCCUPATIONAL MEDICINE (OUTPATIENT)
Dept: OCCUPATIONAL MEDICINE | Facility: CLINIC | Age: 54
End: 2019-11-14
Payer: COMMERCIAL

## 2019-11-14 VITALS
HEIGHT: 68 IN | OXYGEN SATURATION: 96 % | TEMPERATURE: 98.4 F | SYSTOLIC BLOOD PRESSURE: 134 MMHG | HEART RATE: 100 BPM | WEIGHT: 180 LBS | BODY MASS INDEX: 27.28 KG/M2 | DIASTOLIC BLOOD PRESSURE: 82 MMHG | RESPIRATION RATE: 18 BRPM

## 2019-11-14 DIAGNOSIS — S39.012D LUMBAR STRAIN, SUBSEQUENT ENCOUNTER: ICD-10-CM

## 2019-11-14 PROCEDURE — 99213 OFFICE O/P EST LOW 20 MIN: CPT | Mod: 29 | Performed by: NURSE PRACTITIONER

## 2019-11-14 ASSESSMENT — ENCOUNTER SYMPTOMS
SENSORY CHANGE: 1
CONSTITUTIONAL NEGATIVE: 1
TINGLING: 1
RESPIRATORY NEGATIVE: 1
MYALGIAS: 1
CARDIOVASCULAR NEGATIVE: 1
BACK PAIN: 1
WEAKNESS: 1
PSYCHIATRIC NEGATIVE: 1

## 2019-11-14 NOTE — LETTER
[address],   [address] [place] [address]  Phone:  [phone] - Fax:  [phone]   Occupational [facility] Progress Report and Disability Certification  Date of Service: 11/14/2019   No Show:  No  Date / Time of Next Visit: 11/22/2019 @ 1:30 PM   Claim Information   Patient Name: Devaughn Amador  Claim Number:     Employer:   [email or web address] Date of Injury: 10/7/2019     Insurer / TPA: [employer]  ID / SSN:     Occupation:   Diagnosis: The encounter diagnosis was Lumbar strain, subsequent encounter.    Medical Information   Related to Industrial Injury?   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  He states he felt a sudden sharp and shooting pain in his lower back that radiates to mid back.      Today patient states slow pace improvement.  Patient cannot sit or stand for much long. Patient states that he been using heat with moderate relief. Patient is taking Ibuprofen with some relief. Patient states that muscle relaxer has been ineffective. Patient states that the gel has been ineffective. Patient has been trying to stretch his back to keep it moving, which provides mild relief. Pain is described as constant, migrating down to his leg, but not to the toes, feet, or ankle, and its stuck in the calf.  Patient denies saddle anesthesia, loss of bowel/bladder control, or tingling. Patient states that he feels better with more rest and is able to gauge how he is feeling and take breaks as needed. MRI and Physical Therapy were approved today. MRI scheduled for 11/19/19. Plan of care discussed with patient.     Objective Findings: Lumbar:  Moderate bony tenderness over lumbar spine region with moderate paraspinous muscular tenderness and spasm bilateral lumbar region  No bony deformity or step-off noted no  radiculopathy  Unable to perform straight leg test due to pain  Patient is able to ambulate with a cane  Improved ability getting up and down from sitting position  Distal neurovascular intact  Unable to heel/toe walk with minimal difficulty   Cranial nerves grossly intact   Achilles and patellar reflexes 2+ bilaterally   Pre-Existing Condition(s):     Assessment:   Condition Improved    Status: Additional Care Required  Permanent Disability:No    Plan: Diagnostics    Diagnostics: MRI    Comments:  Follow-up in 1 week  Work restrictions applied   MRI scheduled 11/19/19  Continue with ice and heat as tolerated   Continue with OTC ibuprofen as needed   Continue with gentle range of motion and stretching exercises as tolerated   Physical therapy a  ppointments pending scheduling      Disability Information   Status: Released to Restricted Duty    From:  11/14/2019  Through: 11/22/2019 Restrictions are: Temporary   Physical Restrictions   Sitting:    Standing:    Stooping:  < or = to 2 hrs/day Bending:  < or = to 2 hrs/day   Squatting:    Walking:    Climbing:    Pushing:      Pulling:    Other:    Reaching Above Shoulder (L):   Reaching Above Shoulder (R):       Reaching Below Shoulder (L):    Reaching Below Shoulder (R):      Not to exceed Weight Limits   Carrying(hrs):   Weight Limit(lb): < or = to 10 pounds Lifting(hrs):   Weight  Limit(lb): < or = to 10 pounds   Comments:      Repetitive Actions   Hands: i.e. Fine Manipulations from Grasping:     Feet: i.e. Operating Foot Controls:     Driving / Operate Machinery: < or = to 2 hrs/day   Physician Name: DEEP Byrnes Physician Signature:   e-Signature: Dr. Elías Lewis, Medical Director   Clinic Name / Location: [address] [address] Clinic Phone Number: Dept: [phone]   Appointment Time: 1:30 Pm Visit Start Time: 1:25 PM   Check-In Time:  1:19 Pm Visit Discharge Time: 2:25 PM       Original-Treating Physician or Chiropractor    Page 2-Insurer/TPA    Page 3-Employer    Page 4-Employee

## 2019-11-22 ENCOUNTER — OCCUPATIONAL MEDICINE (OUTPATIENT)
Dept: OCCUPATIONAL MEDICINE | Facility: CLINIC | Age: 54
End: 2019-11-22
Payer: COMMERCIAL

## 2019-11-22 VITALS
WEIGHT: 180 LBS | RESPIRATION RATE: 18 BRPM | BODY MASS INDEX: 27.28 KG/M2 | TEMPERATURE: 98.9 F | DIASTOLIC BLOOD PRESSURE: 82 MMHG | OXYGEN SATURATION: 100 % | HEART RATE: 100 BPM | SYSTOLIC BLOOD PRESSURE: 124 MMHG | HEIGHT: 68 IN

## 2019-11-22 DIAGNOSIS — M54.16 LUMBAR RADICULOPATHY: ICD-10-CM

## 2019-11-22 DIAGNOSIS — S39.012D STRAIN OF LUMBAR REGION, SUBSEQUENT ENCOUNTER: ICD-10-CM

## 2019-11-22 PROCEDURE — 99213 OFFICE O/P EST LOW 20 MIN: CPT | Mod: 29 | Performed by: NURSE PRACTITIONER

## 2019-11-22 ASSESSMENT — ENCOUNTER SYMPTOMS
CARDIOVASCULAR NEGATIVE: 1
TINGLING: 1
PSYCHIATRIC NEGATIVE: 1
BACK PAIN: 1
MYALGIAS: 1
CONSTITUTIONAL NEGATIVE: 1
RESPIRATORY NEGATIVE: 1
WEAKNESS: 0
SENSORY CHANGE: 1

## 2019-11-22 NOTE — PROGRESS NOTES
"Subjective:      Devaughn Amador is a 54 y.o. 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  He states he felt a sudden sharp and shooting pain in his lower back that radiates to mid back.      Today patient states slow pace improvement.  Patient cannot sit or stand for much long. Patient states that he been using heat with moderate relief. Patient is taking Ibuprofen with some relief. Patient states that muscle relaxer has been ineffective. Patient states that the gel has been ineffective. Patient has been trying to stretch his back to keep it moving, which provides mild relief. Pain is described as constant, migrating down to his leg, but not to the toes, feet, or ankle, and its stuck in the calf.  Patient denies saddle anesthesia, loss of bowel/bladder control, or tingling. Patient states that he feels better with more rest and is able to gauge how he is feeling and take breaks as needed. Physical Therapy sessions started.  Patient states physical therapy sessions has not helped or hurt at this time.  MRI results reviewed. Plan of care discussed with patient.        HPI    Review of Systems   Constitutional: Negative.    Respiratory: Negative.    Cardiovascular: Negative.    Musculoskeletal: Positive for back pain, joint pain and myalgias.   Skin: Negative.    Neurological: Positive for tingling and sensory change. Negative for weakness.   Psychiatric/Behavioral: Negative.         SOCHX: Works as a  at [employer]   FH: No pertinent family history to this problem.       Objective:     /82   Pulse 100   Temp 37.2 °C (98.9 °F)   Resp 18   Ht 1.727 m (5' 8\")   Wt 81.6 kg (180 lb)   SpO2 100%   BMI 27.37 kg/m²      Physical Exam  Constitutional:       Appearance: Normal appearance.   Cardiovascular:      Rate and Rhythm: Normal rate and " regular rhythm.   Pulmonary:      Effort: Pulmonary effort is normal.   Musculoskeletal:         General: Tenderness and signs of injury present. No swelling or deformity.      Lumbar back: He exhibits tenderness, bony tenderness and pain. He exhibits normal range of motion, no swelling, no deformity, no spasm and normal pulse.        Back:    Skin:     General: Skin is warm and dry.      Capillary Refill: Capillary refill takes less than 2 seconds.      Findings: No bruising or erythema.   Neurological:      General: No focal deficit present.      Mental Status: He is alert and oriented to person, place, and time.      Cranial Nerves: No cranial nerve deficit.      Sensory: No sensory deficit.      Motor: No weakness.      Coordination: Coordination normal.      Gait: Gait normal.      Deep Tendon Reflexes: Reflexes normal.         Lumbar:  Moderate bony tenderness over lumbar spine region with moderate paraspinous muscular tenderness and spasm bilateral lumbar region  No bony deformity or step-off noted no radiculopathy  Unable to perform straight leg test due to pain  Patient is able to ambulate with a cane (personal cane, from previous injury)  Improved ability getting up and down from sitting position  Distal neurovascular intact  Unable to heel/toe walk with minimal difficulty   Cranial nerves grossly intact   Achilles and patellar reflexes 2+ bilaterally    MRI 11/19/19:  Bilateral L5 spondylosis with grade 1 arthrodesis.  There is moderate bilateral neuroforaminal narrowing with abutment upon the exiting bilateral L5 nerve root concerning for radiculopathy.       Assessment/Plan:       1. Strain of lumbar region, subsequent encounter    2. Lumbar radiculopathy    - REFERRAL TO NEUROSURGERY    Follow-up in 3 weeks, if not seen by neurosurgery  Work restrictions applied, neurosurgery   Continue with ice and heat as tolerated   Continue with OTC ibuprofen as needed   Continue with gentle range of motion and  stretching exercises as tolerated   Physical therapy appointments as scheduled

## 2019-11-22 NOTE — LETTER
[address],   [address] [place] [address]  Phone:  [phone] - Fax:  [phone]   Occupational Health [facility] Progress Report and Disability Certification  Date of Service: 11/22/2019   No Show:  No  Date / Time of Next Visit:  Discharged/Transfer Care to Neurosurgery   Claim Information   Patient Name: Devaughn Amador  Claim Number:     Employer:    [employer] Date of Injury: 10/7/2019     Insurer / TPA: [employer] [place]  ID / SSN:     Occupation:   Diagnosis: Diagnoses of Strain of lumbar region, subsequent encounter and Lumbar radiculopathy were pertinent to this visit.    Medical Information   Related to Industrial Injury?   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  He states he felt a sudden sharp and shooting pain in his lower back that radiates to mid back.      Today patient states slow pace improvement.  Patient cannot sit or stand for much long. Patient states that he been using heat with moderate relief. Patient is taking Ibuprofen with some relief. Patient states that muscle relaxer has been ineffective. Patient states that the gel has been ineffective. Patient has been trying to stretch his back to keep it moving, which provides mild relief. Pain is described as constant, migrating down to his leg, but not to the toes, feet, or ankle, and its stuck in the calf.  Patient denies saddle anesthesia, loss of bowel/bladder control, or tingling. Patient states that he feels better with more rest and is able to gauge how he is feeling and take breaks as needed. Physical Therapy sessions started.  Patient states physical therapy sessions has not helped or hurt at this time.  MRI results reviewed. Plan of care discussed with patient.      Objective Findings: Lumbar:  Moderate bony tenderness over lumbar  spine region with moderate paraspinous muscular tenderness and spasm bilateral lumbar region  No bony deformity or step-off noted no radiculopathy  Unable to perform straight leg test due to pain  Patient is able to ambulate with a cane (personal cane, from previous injury)  Improved ability getting up and down from sitting position  Distal neurovascular intact  Unable to heel/toe walk with minimal difficulty   Cranial nerves grossly intact   Achilles and patellar reflexes 2+ bilaterally    MRI 11/19/19:  Bilateral L5 spondylosis with grade 1 arthrodesis.  There is moderate bilateral neuroforaminal narrowing with abutment upon the exiting bilateral L5 nerve root concerning for radiculopathy.   Pre-Existing Condition(s):     Assessment:   Condition Same    Status: Discharged / Care Transfer  Permanent Disability:No    Plan: Transfer Care    Diagnostics:      Comments:  Follow-up in 3 weeks, if not seen by neurosurgery  Work restrictions applied, neurosurgery   Continue with ice and heat as tolerated   Continue with OTC ibuprofen as needed   Continue with gentle range of motion and stretching exercises as tolerated     Physical therapy appointments as scheduled      Disability Information   Status: Released to Restricted Duty    From:  11/22/2019  Through:   Restrictions are: Temporary   Physical Restrictions   Sitting:    Standing:    Stooping:  < or = to 2 hrs/day Bending:  < or = to 2 hrs/day   Squatting:    Walking:    Climbing:    Pushing:      Pulling:    Other:    Reaching Above Shoulder (L):   Reaching Above Shoulder (R):       Reaching Below Shoulder (L):    Reaching Below Shoulder (R):      Not to exceed Weight Limits   Carrying(hrs):   Weight Limit(lb): < or = to 10 pounds Lifting(hrs):   Weight  Limit(lb): < or = to 10 pounds   Comments:      Repetitive Actions   Hands: i.e. Fine Manipulations from Grasping:     Feet: i.e. Operating Foot Controls:     Driving / Operate Machinery: < or = to 2 hrs/day      Physician Name: DEEP Byrnes Physician Signature:   e-Signature: Dr. Elías Lewis, Medical Director   Clinic Name / Location: [address],   [address]  [place] [address] Clinic Phone Number: Dept: [phone]   Appointment Time: 1:30 Pm Visit Start Time: 1:33 PM   Check-In Time:  1:28 Pm Visit Discharge Time:  2:09 pm    Original-Treating Physician or Chiropractor    Page 2-Insurer/TPA    Page 3-Employer    Page 4-Employee

## 2020-01-14 DIAGNOSIS — S39.012D STRAIN OF LUMBAR REGION, SUBSEQUENT ENCOUNTER: ICD-10-CM

## 2020-01-14 DIAGNOSIS — M54.16 LUMBAR RADICULOPATHY: ICD-10-CM

## 2020-05-20 DIAGNOSIS — Z01.810 PRE-OPERATIVE CARDIOVASCULAR EXAMINATION: ICD-10-CM

## 2020-05-20 DIAGNOSIS — Z01.812 PRE-OPERATIVE LABORATORY EXAMINATION: ICD-10-CM

## 2020-05-20 LAB
ABO GROUP BLD: NORMAL
ANION GAP SERPL CALC-SCNC: 12 MMOL/L (ref 7–16)
APPEARANCE UR: CLEAR
BASOPHILS # BLD AUTO: 0.7 % (ref 0–1.8)
BASOPHILS # BLD: 0.08 K/UL (ref 0–0.12)
BILIRUB UR QL STRIP.AUTO: NEGATIVE
BLD GP AB SCN SERPL QL: NORMAL
BUN SERPL-MCNC: 18 MG/DL (ref 8–22)
CALCIUM SERPL-MCNC: 10 MG/DL (ref 8.5–10.5)
CHLORIDE SERPL-SCNC: 99 MMOL/L (ref 96–112)
CO2 SERPL-SCNC: 24 MMOL/L (ref 20–33)
COLOR UR: YELLOW
CREAT SERPL-MCNC: 1.06 MG/DL (ref 0.5–1.4)
EOSINOPHIL # BLD AUTO: 0.27 K/UL (ref 0–0.51)
EOSINOPHIL NFR BLD: 2.3 % (ref 0–6.9)
ERYTHROCYTE [DISTWIDTH] IN BLOOD BY AUTOMATED COUNT: 38.3 FL (ref 35.9–50)
GLUCOSE SERPL-MCNC: 116 MG/DL (ref 65–99)
GLUCOSE UR STRIP.AUTO-MCNC: NEGATIVE MG/DL
HCT VFR BLD AUTO: 50.2 % (ref 42–52)
HGB BLD-MCNC: 17 G/DL (ref 14–18)
IMM GRANULOCYTES # BLD AUTO: 0.23 K/UL (ref 0–0.11)
IMM GRANULOCYTES NFR BLD AUTO: 2 % (ref 0–0.9)
KETONES UR STRIP.AUTO-MCNC: NEGATIVE MG/DL
LEUKOCYTE ESTERASE UR QL STRIP.AUTO: NEGATIVE
LYMPHOCYTES # BLD AUTO: 2.73 K/UL (ref 1–4.8)
LYMPHOCYTES NFR BLD: 23.5 % (ref 22–41)
MCH RBC QN AUTO: 29.8 PG (ref 27–33)
MCHC RBC AUTO-ENTMCNC: 33.9 G/DL (ref 33.7–35.3)
MCV RBC AUTO: 87.9 FL (ref 81.4–97.8)
MICRO URNS: NORMAL
MONOCYTES # BLD AUTO: 1.05 K/UL (ref 0–0.85)
MONOCYTES NFR BLD AUTO: 9.1 % (ref 0–13.4)
NEUTROPHILS # BLD AUTO: 7.24 K/UL (ref 1.82–7.42)
NEUTROPHILS NFR BLD: 62.4 % (ref 44–72)
NITRITE UR QL STRIP.AUTO: NEGATIVE
NRBC # BLD AUTO: 0 K/UL
NRBC BLD-RTO: 0 /100 WBC
PH UR STRIP.AUTO: 5 [PH] (ref 5–8)
PLATELET # BLD AUTO: 337 K/UL (ref 164–446)
PMV BLD AUTO: 10.4 FL (ref 9–12.9)
POTASSIUM SERPL-SCNC: 4.6 MMOL/L (ref 3.6–5.5)
PROT UR QL STRIP: NEGATIVE MG/DL
RBC # BLD AUTO: 5.71 M/UL (ref 4.7–6.1)
RBC UR QL AUTO: NEGATIVE
RH BLD: NORMAL
SODIUM SERPL-SCNC: 135 MMOL/L (ref 135–145)
SP GR UR STRIP.AUTO: 1.02
UROBILINOGEN UR STRIP.AUTO-MCNC: 0.2 MG/DL
WBC # BLD AUTO: 11.6 K/UL (ref 4.8–10.8)

## 2020-05-20 PROCEDURE — 86900 BLOOD TYPING SEROLOGIC ABO: CPT

## 2020-05-20 PROCEDURE — 81003 URINALYSIS AUTO W/O SCOPE: CPT

## 2020-05-20 PROCEDURE — 85025 COMPLETE CBC W/AUTO DIFF WBC: CPT

## 2020-05-20 PROCEDURE — 86850 RBC ANTIBODY SCREEN: CPT

## 2020-05-20 PROCEDURE — 36415 COLL VENOUS BLD VENIPUNCTURE: CPT

## 2020-05-20 PROCEDURE — 86901 BLOOD TYPING SEROLOGIC RH(D): CPT

## 2020-05-20 PROCEDURE — 93005 ELECTROCARDIOGRAM TRACING: CPT

## 2020-05-20 PROCEDURE — 80048 BASIC METABOLIC PNL TOTAL CA: CPT

## 2020-05-20 RX ORDER — MELOXICAM 7.5 MG/1
15 TABLET ORAL DAILY
Status: ON HOLD | COMMUNITY
End: 2020-05-26

## 2020-05-20 RX ORDER — LORATADINE 10 MG/1
1 CAPSULE, LIQUID FILLED ORAL
COMMUNITY

## 2020-05-21 ENCOUNTER — OFFICE VISIT (OUTPATIENT)
Dept: ADMISSIONS | Facility: MEDICAL CENTER | Age: 55
DRG: 460 | End: 2020-05-21
Attending: NEUROLOGICAL SURGERY
Payer: COMMERCIAL

## 2020-05-21 DIAGNOSIS — Z01.812 PRE-OPERATIVE LABORATORY EXAMINATION: ICD-10-CM

## 2020-05-21 LAB
COVID ORDER STATUS COVID19: NORMAL
EKG IMPRESSION: NORMAL

## 2020-05-21 PROCEDURE — 93010 ELECTROCARDIOGRAM REPORT: CPT | Performed by: INTERNAL MEDICINE

## 2020-05-21 PROCEDURE — C9803 HOPD COVID-19 SPEC COLLECT: HCPCS

## 2020-05-23 LAB
SARS-COV-2 RNA RESP QL NAA+PROBE: NOT DETECTED
SPECIMEN SOURCE: NORMAL

## 2020-05-25 NOTE — OR NURSING
COVID-19 Pre-surgery screenin. Do you have an undiagnosed respiratory illness or symptoms such as coughing or sneezing?   a. Onset of Sx   b. Acute vs. chronic respiratory illness    NO     2. Do you have an unexplained fever greater than 100.4 degrees Fahrenheit or 38 degrees Celsius?                 NO        3. Have you had direct exposure to a patient who tested positive for Covid-19?                        NO        4. Have you traveled outside of [place] within the last 14 days?                   NO       Informed of no visitor policy-YES

## 2020-05-26 ENCOUNTER — APPOINTMENT (OUTPATIENT)
Dept: RADIOLOGY | Facility: MEDICAL CENTER | Age: 55
DRG: 460 | End: 2020-05-26
Attending: NEUROLOGICAL SURGERY
Payer: COMMERCIAL

## 2020-05-26 ENCOUNTER — ANESTHESIA EVENT (OUTPATIENT)
Dept: SURGERY | Facility: MEDICAL CENTER | Age: 55
DRG: 460 | End: 2020-05-26
Payer: COMMERCIAL

## 2020-05-26 ENCOUNTER — ANESTHESIA (OUTPATIENT)
Dept: SURGERY | Facility: MEDICAL CENTER | Age: 55
DRG: 460 | End: 2020-05-26
Payer: COMMERCIAL

## 2020-05-26 ENCOUNTER — HOSPITAL ENCOUNTER (INPATIENT)
Facility: MEDICAL CENTER | Age: 55
LOS: 6 days | DRG: 460 | End: 2020-06-01
Attending: NEUROLOGICAL SURGERY | Admitting: NEUROLOGICAL SURGERY
Payer: COMMERCIAL

## 2020-05-26 DIAGNOSIS — M48.062 LUMBAR STENOSIS WITH NEUROGENIC CLAUDICATION: ICD-10-CM

## 2020-05-26 PROCEDURE — 501838 HCHG SUTURE GENERAL: Performed by: NEUROLOGICAL SURGERY

## 2020-05-26 PROCEDURE — 700111 HCHG RX REV CODE 636 W/ 250 OVERRIDE (IP): Performed by: ANESTHESIOLOGY

## 2020-05-26 PROCEDURE — 500367 HCHG DRAIN KIT, HEMOVAC: Performed by: NEUROLOGICAL SURGERY

## 2020-05-26 PROCEDURE — 700102 HCHG RX REV CODE 250 W/ 637 OVERRIDE(OP): Performed by: ANESTHESIOLOGY

## 2020-05-26 PROCEDURE — 110371 HCHG SHELL REV 272: Performed by: NEUROLOGICAL SURGERY

## 2020-05-26 PROCEDURE — 160035 HCHG PACU - 1ST 60 MINS PHASE I: Performed by: NEUROLOGICAL SURGERY

## 2020-05-26 PROCEDURE — A9270 NON-COVERED ITEM OR SERVICE: HCPCS | Performed by: NEUROLOGICAL SURGERY

## 2020-05-26 PROCEDURE — 01NB0ZZ RELEASE LUMBAR NERVE, OPEN APPROACH: ICD-10-PCS | Performed by: NEUROLOGICAL SURGERY

## 2020-05-26 PROCEDURE — C1713 ANCHOR/SCREW BN/BN,TIS/BN: HCPCS | Performed by: NEUROLOGICAL SURGERY

## 2020-05-26 PROCEDURE — 0SB40ZZ EXCISION OF LUMBOSACRAL DISC, OPEN APPROACH: ICD-10-PCS | Performed by: NEUROLOGICAL SURGERY

## 2020-05-26 PROCEDURE — A9270 NON-COVERED ITEM OR SERVICE: HCPCS | Performed by: NURSE PRACTITIONER

## 2020-05-26 PROCEDURE — 72100 X-RAY EXAM L-S SPINE 2/3 VWS: CPT

## 2020-05-26 PROCEDURE — 700101 HCHG RX REV CODE 250: Performed by: ANESTHESIOLOGY

## 2020-05-26 PROCEDURE — 160009 HCHG ANES TIME/MIN: Performed by: NEUROLOGICAL SURGERY

## 2020-05-26 PROCEDURE — 700105 HCHG RX REV CODE 258: Performed by: NEUROLOGICAL SURGERY

## 2020-05-26 PROCEDURE — 160031 HCHG SURGERY MINUTES - 1ST 30 MINS LEVEL 5: Performed by: NEUROLOGICAL SURGERY

## 2020-05-26 PROCEDURE — A4314 CATH W/DRAINAGE 2-WAY LATEX: HCPCS | Performed by: NEUROLOGICAL SURGERY

## 2020-05-26 PROCEDURE — 160002 HCHG RECOVERY MINUTES (STAT): Performed by: NEUROLOGICAL SURGERY

## 2020-05-26 PROCEDURE — 700111 HCHG RX REV CODE 636 W/ 250 OVERRIDE (IP): Performed by: NEUROLOGICAL SURGERY

## 2020-05-26 PROCEDURE — 500885 HCHG PACK, JACKSON TABLE: Performed by: NEUROLOGICAL SURGERY

## 2020-05-26 PROCEDURE — 0SB20ZZ EXCISION OF LUMBAR VERTEBRAL DISC, OPEN APPROACH: ICD-10-PCS | Performed by: NEUROLOGICAL SURGERY

## 2020-05-26 PROCEDURE — 700102 HCHG RX REV CODE 250 W/ 637 OVERRIDE(OP): Performed by: NEUROLOGICAL SURGERY

## 2020-05-26 PROCEDURE — 700101 HCHG RX REV CODE 250: Performed by: NEUROLOGICAL SURGERY

## 2020-05-26 PROCEDURE — 770006 HCHG ROOM/CARE - MED/SURG/GYN SEMI*

## 2020-05-26 PROCEDURE — C1821 INTERSPINOUS IMPLANT: HCPCS | Performed by: NEUROLOGICAL SURGERY

## 2020-05-26 PROCEDURE — 160042 HCHG SURGERY MINUTES - EA ADDL 1 MIN LEVEL 5: Performed by: NEUROLOGICAL SURGERY

## 2020-05-26 PROCEDURE — A9270 NON-COVERED ITEM OR SERVICE: HCPCS | Performed by: ANESTHESIOLOGY

## 2020-05-26 PROCEDURE — L0637 LSO SC R ANT/POS PNL PRE CST: HCPCS

## 2020-05-26 PROCEDURE — 700111 HCHG RX REV CODE 636 W/ 250 OVERRIDE (IP): Performed by: NURSE PRACTITIONER

## 2020-05-26 PROCEDURE — 700112 HCHG RX REV CODE 229: Performed by: NURSE PRACTITIONER

## 2020-05-26 PROCEDURE — 110454 HCHG SHELL REV 250: Performed by: NEUROLOGICAL SURGERY

## 2020-05-26 PROCEDURE — 160036 HCHG PACU - EA ADDL 30 MINS PHASE I: Performed by: NEUROLOGICAL SURGERY

## 2020-05-26 PROCEDURE — 160048 HCHG OR STATISTICAL LEVEL 1-5: Performed by: NEUROLOGICAL SURGERY

## 2020-05-26 PROCEDURE — 0SG30AJ FUSION OF LUMBOSACRAL JOINT WITH INTERBODY FUSION DEVICE, POSTERIOR APPROACH, ANTERIOR COLUMN, OPEN APPROACH: ICD-10-PCS | Performed by: NEUROLOGICAL SURGERY

## 2020-05-26 DEVICE — ROD PREBENT TITANIUM 5.5 X 35MM (2TCONX2=4): Type: IMPLANTABLE DEVICE | Site: SPINE LUMBAR | Status: FUNCTIONAL

## 2020-05-26 DEVICE — SCREW MAS  SOLERA 6.5 X 50MM (1TCX16+3TCX8=40): Type: IMPLANTABLE DEVICE | Site: SPINE LUMBAR | Status: FUNCTIONAL

## 2020-05-26 DEVICE — SPACER RISE 10X22MM 8-14MM 10 DEGREES (3TCONX2=6): Type: IMPLANTABLE DEVICE | Site: SPINE LUMBAR | Status: FUNCTIONAL

## 2020-05-26 DEVICE — MAGNIFUSE 1X5CM: Type: IMPLANTABLE DEVICE | Site: SPINE LUMBAR | Status: FUNCTIONAL

## 2020-05-26 DEVICE — SCREW MAS  SOLERA 6.5 X 45MM (1TCX16+3TCX8=40): Type: IMPLANTABLE DEVICE | Site: SPINE LUMBAR | Status: FUNCTIONAL

## 2020-05-26 DEVICE — SCREW SOLERA SET SCREW (1TCX40+3TCX21+2TCX10=123): Type: IMPLANTABLE DEVICE | Site: SPINE LUMBAR | Status: FUNCTIONAL

## 2020-05-26 RX ORDER — PROMETHAZINE HYDROCHLORIDE 25 MG/1
12.5-25 TABLET ORAL EVERY 4 HOURS PRN
Status: DISCONTINUED | OUTPATIENT
Start: 2020-05-26 | End: 2020-06-01 | Stop reason: HOSPADM

## 2020-05-26 RX ORDER — HYDROMORPHONE HYDROCHLORIDE 1 MG/ML
0.2 INJECTION, SOLUTION INTRAMUSCULAR; INTRAVENOUS; SUBCUTANEOUS
Status: DISCONTINUED | OUTPATIENT
Start: 2020-05-26 | End: 2020-05-26 | Stop reason: HOSPADM

## 2020-05-26 RX ORDER — PROMETHAZINE HYDROCHLORIDE 12.5 MG/1
12.5-25 SUPPOSITORY RECTAL EVERY 4 HOURS PRN
Status: DISCONTINUED | OUTPATIENT
Start: 2020-05-26 | End: 2020-06-01 | Stop reason: HOSPADM

## 2020-05-26 RX ORDER — BISACODYL 10 MG
10 SUPPOSITORY, RECTAL RECTAL
Status: DISCONTINUED | OUTPATIENT
Start: 2020-05-26 | End: 2020-06-01 | Stop reason: HOSPADM

## 2020-05-26 RX ORDER — OXYCODONE HCL 5 MG/5 ML
10 SOLUTION, ORAL ORAL
Status: COMPLETED | OUTPATIENT
Start: 2020-05-26 | End: 2020-05-26

## 2020-05-26 RX ORDER — SODIUM CHLORIDE, SODIUM LACTATE, POTASSIUM CHLORIDE, CALCIUM CHLORIDE 600; 310; 30; 20 MG/100ML; MG/100ML; MG/100ML; MG/100ML
INJECTION, SOLUTION INTRAVENOUS CONTINUOUS
Status: DISCONTINUED | OUTPATIENT
Start: 2020-05-26 | End: 2020-05-26 | Stop reason: HOSPADM

## 2020-05-26 RX ORDER — ACETAMINOPHEN 500 MG
1000 TABLET ORAL ONCE
Status: COMPLETED | OUTPATIENT
Start: 2020-05-26 | End: 2020-05-26

## 2020-05-26 RX ORDER — GABAPENTIN 300 MG/1
300 CAPSULE ORAL ONCE
Status: COMPLETED | OUTPATIENT
Start: 2020-05-26 | End: 2020-05-26

## 2020-05-26 RX ORDER — DEXAMETHASONE SODIUM PHOSPHATE 4 MG/ML
INJECTION, SOLUTION INTRA-ARTICULAR; INTRALESIONAL; INTRAMUSCULAR; INTRAVENOUS; SOFT TISSUE PRN
Status: DISCONTINUED | OUTPATIENT
Start: 2020-05-26 | End: 2020-05-26 | Stop reason: SURG

## 2020-05-26 RX ORDER — DIPHENHYDRAMINE HYDROCHLORIDE 50 MG/ML
25 INJECTION INTRAMUSCULAR; INTRAVENOUS EVERY 6 HOURS PRN
Status: DISCONTINUED | OUTPATIENT
Start: 2020-05-26 | End: 2020-06-01 | Stop reason: HOSPADM

## 2020-05-26 RX ORDER — HYDRALAZINE HYDROCHLORIDE 20 MG/ML
5 INJECTION INTRAMUSCULAR; INTRAVENOUS
Status: DISCONTINUED | OUTPATIENT
Start: 2020-05-26 | End: 2020-05-26 | Stop reason: HOSPADM

## 2020-05-26 RX ORDER — HYDROCODONE BITARTRATE AND ACETAMINOPHEN 5; 325 MG/1; MG/1
1 TABLET ORAL 2 TIMES DAILY PRN
COMMUNITY
Start: 2020-05-14

## 2020-05-26 RX ORDER — HYDROMORPHONE HYDROCHLORIDE 1 MG/ML
0.4 INJECTION, SOLUTION INTRAMUSCULAR; INTRAVENOUS; SUBCUTANEOUS
Status: DISCONTINUED | OUTPATIENT
Start: 2020-05-26 | End: 2020-05-26 | Stop reason: HOSPADM

## 2020-05-26 RX ORDER — OXYCODONE HCL 5 MG/5 ML
5 SOLUTION, ORAL ORAL
Status: COMPLETED | OUTPATIENT
Start: 2020-05-26 | End: 2020-05-26

## 2020-05-26 RX ORDER — ENEMA 19; 7 G/133ML; G/133ML
1 ENEMA RECTAL
Status: DISCONTINUED | OUTPATIENT
Start: 2020-05-26 | End: 2020-06-01 | Stop reason: HOSPADM

## 2020-05-26 RX ORDER — ONDANSETRON 2 MG/ML
4 INJECTION INTRAMUSCULAR; INTRAVENOUS EVERY 4 HOURS PRN
Status: DISCONTINUED | OUTPATIENT
Start: 2020-05-26 | End: 2020-06-01 | Stop reason: HOSPADM

## 2020-05-26 RX ORDER — VANCOMYCIN HYDROCHLORIDE 1 G/20ML
INJECTION, POWDER, LYOPHILIZED, FOR SOLUTION INTRAVENOUS
Status: COMPLETED | OUTPATIENT
Start: 2020-05-26 | End: 2020-05-26

## 2020-05-26 RX ORDER — PROCHLORPERAZINE EDISYLATE 5 MG/ML
5-10 INJECTION INTRAMUSCULAR; INTRAVENOUS EVERY 4 HOURS PRN
Status: DISCONTINUED | OUTPATIENT
Start: 2020-05-26 | End: 2020-06-01 | Stop reason: HOSPADM

## 2020-05-26 RX ORDER — HYDRALAZINE HYDROCHLORIDE 20 MG/ML
10 INJECTION INTRAMUSCULAR; INTRAVENOUS
Status: DISCONTINUED | OUTPATIENT
Start: 2020-05-26 | End: 2020-06-01 | Stop reason: HOSPADM

## 2020-05-26 RX ORDER — HYDROMORPHONE HYDROCHLORIDE 1 MG/ML
0.5 INJECTION, SOLUTION INTRAMUSCULAR; INTRAVENOUS; SUBCUTANEOUS ONCE
Status: COMPLETED | OUTPATIENT
Start: 2020-05-26 | End: 2020-05-26

## 2020-05-26 RX ORDER — ONDANSETRON 4 MG/1
4 TABLET, ORALLY DISINTEGRATING ORAL EVERY 4 HOURS PRN
Status: DISCONTINUED | OUTPATIENT
Start: 2020-05-26 | End: 2020-06-01 | Stop reason: HOSPADM

## 2020-05-26 RX ORDER — DIPHENHYDRAMINE HCL 25 MG
25-50 TABLET ORAL
Status: DISCONTINUED | OUTPATIENT
Start: 2020-05-26 | End: 2020-06-01 | Stop reason: HOSPADM

## 2020-05-26 RX ORDER — HYDROMORPHONE HYDROCHLORIDE 2 MG/ML
INJECTION, SOLUTION INTRAMUSCULAR; INTRAVENOUS; SUBCUTANEOUS PRN
Status: DISCONTINUED | OUTPATIENT
Start: 2020-05-26 | End: 2020-05-26 | Stop reason: SURG

## 2020-05-26 RX ORDER — SODIUM CHLORIDE AND POTASSIUM CHLORIDE 150; 900 MG/100ML; MG/100ML
INJECTION, SOLUTION INTRAVENOUS CONTINUOUS
Status: DISCONTINUED | OUTPATIENT
Start: 2020-05-26 | End: 2020-06-01 | Stop reason: HOSPADM

## 2020-05-26 RX ORDER — CEFAZOLIN SODIUM 2 G/100ML
2 INJECTION, SOLUTION INTRAVENOUS EVERY 8 HOURS
Status: COMPLETED | OUTPATIENT
Start: 2020-05-26 | End: 2020-05-27

## 2020-05-26 RX ORDER — ROCURONIUM BROMIDE 10 MG/ML
INJECTION, SOLUTION INTRAVENOUS PRN
Status: DISCONTINUED | OUTPATIENT
Start: 2020-05-26 | End: 2020-05-26 | Stop reason: SURG

## 2020-05-26 RX ORDER — DIPHENHYDRAMINE HCL 25 MG
25 TABLET ORAL EVERY 6 HOURS PRN
Status: DISCONTINUED | OUTPATIENT
Start: 2020-05-26 | End: 2020-06-01 | Stop reason: HOSPADM

## 2020-05-26 RX ORDER — POLYETHYLENE GLYCOL 3350 17 G/17G
1 POWDER, FOR SOLUTION ORAL 2 TIMES DAILY PRN
Status: DISCONTINUED | OUTPATIENT
Start: 2020-05-26 | End: 2020-06-01 | Stop reason: HOSPADM

## 2020-05-26 RX ORDER — HALOPERIDOL 5 MG/ML
1 INJECTION INTRAMUSCULAR
Status: DISCONTINUED | OUTPATIENT
Start: 2020-05-26 | End: 2020-05-26 | Stop reason: HOSPADM

## 2020-05-26 RX ORDER — LORATADINE 10 MG/1
10 TABLET ORAL DAILY
Status: DISCONTINUED | OUTPATIENT
Start: 2020-05-27 | End: 2020-06-01 | Stop reason: HOSPADM

## 2020-05-26 RX ORDER — KETAMINE HYDROCHLORIDE 50 MG/ML
INJECTION, SOLUTION INTRAMUSCULAR; INTRAVENOUS PRN
Status: DISCONTINUED | OUTPATIENT
Start: 2020-05-26 | End: 2020-05-26 | Stop reason: SURG

## 2020-05-26 RX ORDER — AMOXICILLIN 250 MG
1 CAPSULE ORAL
Status: DISCONTINUED | OUTPATIENT
Start: 2020-05-26 | End: 2020-06-01 | Stop reason: HOSPADM

## 2020-05-26 RX ORDER — MEPERIDINE HYDROCHLORIDE 25 MG/ML
6.25 INJECTION INTRAMUSCULAR; INTRAVENOUS; SUBCUTANEOUS
Status: DISCONTINUED | OUTPATIENT
Start: 2020-05-26 | End: 2020-05-26 | Stop reason: HOSPADM

## 2020-05-26 RX ORDER — LABETALOL HYDROCHLORIDE 5 MG/ML
10 INJECTION, SOLUTION INTRAVENOUS
Status: DISCONTINUED | OUTPATIENT
Start: 2020-05-26 | End: 2020-06-01 | Stop reason: HOSPADM

## 2020-05-26 RX ORDER — BUPIVACAINE HYDROCHLORIDE AND EPINEPHRINE 5; 5 MG/ML; UG/ML
INJECTION, SOLUTION EPIDURAL; INTRACAUDAL; PERINEURAL
Status: DISCONTINUED | OUTPATIENT
Start: 2020-05-26 | End: 2020-05-26 | Stop reason: HOSPADM

## 2020-05-26 RX ORDER — AMOXICILLIN 250 MG
1 CAPSULE ORAL NIGHTLY
Status: DISCONTINUED | OUTPATIENT
Start: 2020-05-26 | End: 2020-06-01 | Stop reason: HOSPADM

## 2020-05-26 RX ORDER — LIDOCAINE HYDROCHLORIDE 20 MG/ML
INJECTION, SOLUTION EPIDURAL; INFILTRATION; INTRACAUDAL; PERINEURAL PRN
Status: DISCONTINUED | OUTPATIENT
Start: 2020-05-26 | End: 2020-05-26 | Stop reason: SURG

## 2020-05-26 RX ORDER — DOCUSATE SODIUM 100 MG/1
100 CAPSULE, LIQUID FILLED ORAL 2 TIMES DAILY
Status: DISCONTINUED | OUTPATIENT
Start: 2020-05-26 | End: 2020-06-01 | Stop reason: HOSPADM

## 2020-05-26 RX ORDER — DIPHENHYDRAMINE HYDROCHLORIDE 50 MG/ML
12.5 INJECTION INTRAMUSCULAR; INTRAVENOUS
Status: DISCONTINUED | OUTPATIENT
Start: 2020-05-26 | End: 2020-05-26 | Stop reason: HOSPADM

## 2020-05-26 RX ORDER — SODIUM CHLORIDE, SODIUM LACTATE, POTASSIUM CHLORIDE, CALCIUM CHLORIDE 600; 310; 30; 20 MG/100ML; MG/100ML; MG/100ML; MG/100ML
INJECTION, SOLUTION INTRAVENOUS CONTINUOUS
Status: ACTIVE | OUTPATIENT
Start: 2020-05-26 | End: 2020-05-26

## 2020-05-26 RX ORDER — HYDROMORPHONE HYDROCHLORIDE 1 MG/ML
0.5 INJECTION, SOLUTION INTRAMUSCULAR; INTRAVENOUS; SUBCUTANEOUS
Status: DISCONTINUED | OUTPATIENT
Start: 2020-05-26 | End: 2020-06-01 | Stop reason: HOSPADM

## 2020-05-26 RX ORDER — TIZANIDINE 4 MG/1
4 TABLET ORAL EVERY 6 HOURS PRN
Status: DISCONTINUED | OUTPATIENT
Start: 2020-05-26 | End: 2020-05-28

## 2020-05-26 RX ORDER — M-VIT,TX,IRON,MINS/CALC/FOLIC 27MG-0.4MG
1 TABLET ORAL DAILY
Status: DISCONTINUED | OUTPATIENT
Start: 2020-05-27 | End: 2020-06-01 | Stop reason: HOSPADM

## 2020-05-26 RX ORDER — CEFAZOLIN SODIUM 1 G/3ML
INJECTION, POWDER, FOR SOLUTION INTRAMUSCULAR; INTRAVENOUS PRN
Status: DISCONTINUED | OUTPATIENT
Start: 2020-05-26 | End: 2020-05-26 | Stop reason: SURG

## 2020-05-26 RX ORDER — BACITRACIN 50000 [IU]/1
INJECTION, POWDER, FOR SOLUTION INTRAMUSCULAR
Status: DISCONTINUED | OUTPATIENT
Start: 2020-05-26 | End: 2020-05-26 | Stop reason: HOSPADM

## 2020-05-26 RX ORDER — ONDANSETRON 2 MG/ML
4 INJECTION INTRAMUSCULAR; INTRAVENOUS
Status: DISCONTINUED | OUTPATIENT
Start: 2020-05-26 | End: 2020-05-26 | Stop reason: HOSPADM

## 2020-05-26 RX ORDER — HYDROMORPHONE HYDROCHLORIDE 1 MG/ML
0.1 INJECTION, SOLUTION INTRAMUSCULAR; INTRAVENOUS; SUBCUTANEOUS
Status: DISCONTINUED | OUTPATIENT
Start: 2020-05-26 | End: 2020-05-26 | Stop reason: HOSPADM

## 2020-05-26 RX ORDER — ONDANSETRON 2 MG/ML
INJECTION INTRAMUSCULAR; INTRAVENOUS PRN
Status: DISCONTINUED | OUTPATIENT
Start: 2020-05-26 | End: 2020-05-26 | Stop reason: SURG

## 2020-05-26 RX ADMIN — PROPOFOL 200 MG: 10 INJECTION, EMULSION INTRAVENOUS at 11:36

## 2020-05-26 RX ADMIN — HYDROMORPHONE HYDROCHLORIDE 0.6 MG: 2 INJECTION, SOLUTION INTRAMUSCULAR; INTRAVENOUS; SUBCUTANEOUS at 13:37

## 2020-05-26 RX ADMIN — KETAMINE HYDROCHLORIDE 45 MG: 50 INJECTION INTRAMUSCULAR; INTRAVENOUS at 12:43

## 2020-05-26 RX ADMIN — HYDROMORPHONE HYDROCHLORIDE 0.4 MG: 1 INJECTION, SOLUTION INTRAMUSCULAR; INTRAVENOUS; SUBCUTANEOUS at 16:20

## 2020-05-26 RX ADMIN — FENTANYL CITRATE 50 MCG: 50 INJECTION INTRAMUSCULAR; INTRAVENOUS at 11:57

## 2020-05-26 RX ADMIN — FENTANYL CITRATE 50 MCG: 50 INJECTION INTRAMUSCULAR; INTRAVENOUS at 15:10

## 2020-05-26 RX ADMIN — FENTANYL CITRATE 50 MCG: 50 INJECTION INTRAMUSCULAR; INTRAVENOUS at 11:36

## 2020-05-26 RX ADMIN — DOCUSATE SODIUM 100 MG: 100 CAPSULE, LIQUID FILLED ORAL at 18:42

## 2020-05-26 RX ADMIN — HYDROMORPHONE HYDROCHLORIDE 0.5 MG: 1 INJECTION, SOLUTION INTRAMUSCULAR; INTRAVENOUS; SUBCUTANEOUS at 18:42

## 2020-05-26 RX ADMIN — SODIUM CHLORIDE, POTASSIUM CHLORIDE, SODIUM LACTATE AND CALCIUM CHLORIDE: 600; 310; 30; 20 INJECTION, SOLUTION INTRAVENOUS at 10:34

## 2020-05-26 RX ADMIN — OXYCODONE HYDROCHLORIDE 10 MG: 5 SOLUTION ORAL at 15:10

## 2020-05-26 RX ADMIN — HYDROMORPHONE HYDROCHLORIDE 0.2 MG: 1 INJECTION, SOLUTION INTRAMUSCULAR; INTRAVENOUS; SUBCUTANEOUS at 16:30

## 2020-05-26 RX ADMIN — LIDOCAINE HYDROCHLORIDE 100 MG: 20 INJECTION, SOLUTION EPIDURAL; INFILTRATION; INTRACAUDAL at 11:36

## 2020-05-26 RX ADMIN — PROCHLORPERAZINE EDISYLATE 10 MG: 5 INJECTION INTRAMUSCULAR; INTRAVENOUS at 20:45

## 2020-05-26 RX ADMIN — CEFAZOLIN 2 G: 330 INJECTION, POWDER, FOR SOLUTION INTRAMUSCULAR; INTRAVENOUS at 11:32

## 2020-05-26 RX ADMIN — HYDROMORPHONE HYDROCHLORIDE 0.2 MG: 1 INJECTION, SOLUTION INTRAMUSCULAR; INTRAVENOUS; SUBCUTANEOUS at 16:02

## 2020-05-26 RX ADMIN — HYDROMORPHONE HYDROCHLORIDE 0.4 MG: 2 INJECTION, SOLUTION INTRAMUSCULAR; INTRAVENOUS; SUBCUTANEOUS at 14:15

## 2020-05-26 RX ADMIN — GABAPENTIN 300 MG: 300 CAPSULE ORAL at 10:18

## 2020-05-26 RX ADMIN — HYDROMORPHONE HYDROCHLORIDE: 10 INJECTION, SOLUTION INTRAMUSCULAR; INTRAVENOUS; SUBCUTANEOUS at 18:50

## 2020-05-26 RX ADMIN — CEFAZOLIN SODIUM 2 G: 2 INJECTION, SOLUTION INTRAVENOUS at 20:15

## 2020-05-26 RX ADMIN — HYDROMORPHONE HYDROCHLORIDE 0.4 MG: 1 INJECTION, SOLUTION INTRAMUSCULAR; INTRAVENOUS; SUBCUTANEOUS at 15:49

## 2020-05-26 RX ADMIN — POVIDONE-IODINE 15 ML: 10 SOLUTION TOPICAL at 10:18

## 2020-05-26 RX ADMIN — HYDROMORPHONE HYDROCHLORIDE 0.4 MG: 1 INJECTION, SOLUTION INTRAMUSCULAR; INTRAVENOUS; SUBCUTANEOUS at 16:13

## 2020-05-26 RX ADMIN — ONDANSETRON 4 MG: 2 INJECTION INTRAMUSCULAR; INTRAVENOUS at 14:19

## 2020-05-26 RX ADMIN — DEXAMETHASONE SODIUM PHOSPHATE 8 MG: 4 INJECTION, SOLUTION INTRA-ARTICULAR; INTRALESIONAL; INTRAMUSCULAR; INTRAVENOUS; SOFT TISSUE at 12:43

## 2020-05-26 RX ADMIN — FENTANYL CITRATE 50 MCG: 50 INJECTION INTRAMUSCULAR; INTRAVENOUS at 15:21

## 2020-05-26 RX ADMIN — HYDROMORPHONE HYDROCHLORIDE 0.4 MG: 2 INJECTION, SOLUTION INTRAMUSCULAR; INTRAVENOUS; SUBCUTANEOUS at 14:22

## 2020-05-26 RX ADMIN — HYDROMORPHONE HYDROCHLORIDE 0.4 MG: 1 INJECTION, SOLUTION INTRAMUSCULAR; INTRAVENOUS; SUBCUTANEOUS at 15:54

## 2020-05-26 RX ADMIN — ACETAMINOPHEN 1000 MG: 500 TABLET ORAL at 10:18

## 2020-05-26 RX ADMIN — ROCURONIUM BROMIDE 100 MG: 10 INJECTION, SOLUTION INTRAVENOUS at 11:36

## 2020-05-26 SDOH — HEALTH STABILITY: MENTAL HEALTH: HOW MANY STANDARD DRINKS CONTAINING ALCOHOL DO YOU HAVE ON A TYPICAL DAY?: 1 OR 2

## 2020-05-26 SDOH — HEALTH STABILITY: MENTAL HEALTH: HOW OFTEN DO YOU HAVE 6 OR MORE DRINKS ON ONE OCCASION?: MONTHLY

## 2020-05-26 SDOH — HEALTH STABILITY: MENTAL HEALTH: HOW OFTEN DO YOU HAVE A DRINK CONTAINING ALCOHOL?: 2-3 TIMES A WEEK

## 2020-05-26 NOTE — ANESTHESIA PROCEDURE NOTES
Airway    Date/Time: 5/26/2020 11:26 AM  Performed by: Mickey Melo M.D.  Authorized by: Mickey Melo M.D.     Location:  OR  Urgency:  Elective  Indications for Airway Management:  Anesthesia      Spontaneous Ventilation: absent    Sedation Level:  Deep  Preoxygenated: Yes    Patient Position:  Sniffing  Mask Difficulty Assessment:  0 - not attempted  Final Airway Type:  Endotracheal airway  Final Endotracheal Airway:  ETT  Cuffed: Yes    Technique Used for Successful ETT Placement:  Direct laryngoscopy    Insertion Site:  Oral  Blade Type:  Sarah  Laryngoscope Blade/Videolaryngoscope Blade Size:  3  ETT Size (mm):  7.5  Measured from:  Lips  ETT to Lips (cm):  24  Placement Verified by: auscultation and capnometry    Cormack-Lehane Classification:  Grade I - full view of glottis  Number of Attempts at Approach:  1  Number of Other Approaches Attempted:  0

## 2020-05-26 NOTE — ANESTHESIA TIME REPORT
Anesthesia Start and Stop Event Times     Date Time Event    5/26/2020 1111 Ready for Procedure     1125 Anesthesia Start     1437 Anesthesia Stop        Responsible Staff  05/26/20    Name Role Begin End    Mickey Melo M.D. Anesth 1125 1437        Preop Diagnosis (Free Text):  Pre-op Diagnosis     DEGENERATIVE SPONDYLOLISTHESIS        Preop Diagnosis (Codes):    Post op Diagnosis  Spondylolisthesis of lumbar region      Premium Reason  Non-Premium    Comments:

## 2020-05-26 NOTE — ANESTHESIA PREPROCEDURE EVALUATION
Lumbar spondylolisthesis with leg pain and intermittent weakness. No problems with anesthesia in the past. Denies CP/SOB.    Relevant Problems   No relevant active problems       Physical Exam    Airway   Mallampati: II  TM distance: >3 FB  Neck ROM: full       Cardiovascular - normal exam  Rhythm: regular  Rate: normal  (-) murmur     Dental - normal exam           Pulmonary - normal exam  Breath sounds clear to auscultation     Abdominal    Neurological - normal exam                 Anesthesia Plan    ASA 2       Plan - general       Airway plan will be ETT        Induction: intravenous    Postoperative Plan: Postoperative administration of opioids is intended.    Pertinent diagnostic labs and testing reviewed    Informed Consent:    Anesthetic plan and risks discussed with patient.    Use of blood products discussed with: patient whom consented to blood products.

## 2020-05-26 NOTE — OR SURGEON
Immediate Post OP Note    PreOp Diagnosis: lumbar stenosis/ spondylolisthesis     PostOp Diagnosis:  lumbar stenosis/ spondylolisthesis     Procedure(s):  FUSION, SPINE, LUMBAR, PLIF- L5-S1 TLIF - Wound Class: Clean with Drain  LAMINECTOMY, SPINE, LUMBAR, WITH DISCECTOMY- L2-3 MICRODISC - Wound Class: Clean    Surgeon(s):  Dutch Oscar M.D.    Anesthesiologist/Type of Anesthesia:  Anesthesiologist: Mickey Melo M.D./General    Surgical Staff:  Assistant: CHARLIE Alba  Circulator: Domonique Johnson R.N.  Relief Circulator: Janessa Vergara R.N.  Scrub Person: Tamiko Saavedra; Ruma Li  Radiology Technologist: Beth Huynh; Delaney Cervantes    Specimens removed if any:  * No specimens in log *    Estimated Blood Loss: 100 cc    Findings: good decompression, good hardware placement     Complications: none         5/26/2020 2:38 PM CHARLIE Alba

## 2020-05-26 NOTE — ANESTHESIA QCDR
2019 [facility] Clinical Data Registry (for Quality Improvement)     Postoperative nausea/vomiting risk protocol (Adult = 18 yrs and Pediatric 3-17 yrs)- (430 and 463)  General inhalation anesthetic (NOT TIVA) with PONV risk factors: Yes  Provision of anti-emetic therapy with at least 2 different classes of agents: Yes   Patient DID NOT receive anti-emetic therapy and reason is documented in Medical Record:  N/A    Multimodal Pain Management- (477)  Non-emergent surgery AND patient age >= 18: Yes  Use of Multimodal Pain Management, two or more drugs and/or interventions, NOT including systemic opioids: Yes  Exception: Documented allergy to multiple classes of analgesics: N/A    Smoking Abstinence (404)  Patient is current smoker (cigarette, pipe, e-cig, marijuanna): Yes  Elective Surgery: Yes  Abstinence instructions provided prior to day of surgery: Yes  Patient abstained from smoking on day of surgery: Yes    Pre-Op Beta-Blocker in Isolated CABG (44)  Isolated CABG AND patient age >= 18: No  Beta-blocker admin within 24 hours of surgical incision:   Exception:of medical reason(s) for not administering beta blocker within 24 hours prior to surgical incision (e.g., not  indicated,other medical reason):     PACU assessment of acute postoperative pain prior to Anesthesia Care End- Applies to Patients Age = 18- (ABG7)  Initial PACU pain score is which of the following: < 7/10  Patient unable to report pain score: N/A    Post-anesthetic transfer of care checklist/protocol to PACU/ICU- (426 and 427)  Upon conclusion of case, patient transferred to which of the following locations: PACU/Non-ICU  Use of transfer checklist/protocol: Yes  Exclusion: Service Performed in Patient Hospital Room (and thus did not require transfer): N/A  Unplanned admission to ICU related to anesthesia service up through end of PACU care- (MD51)  Unplanned admission to ICU (not initially anticipated at anesthesia start time): No

## 2020-05-27 LAB
ANION GAP SERPL CALC-SCNC: 12 MMOL/L (ref 7–16)
BUN SERPL-MCNC: 16 MG/DL (ref 8–22)
CALCIUM SERPL-MCNC: 8.8 MG/DL (ref 8.5–10.5)
CHLORIDE SERPL-SCNC: 100 MMOL/L (ref 96–112)
CO2 SERPL-SCNC: 23 MMOL/L (ref 20–33)
CREAT SERPL-MCNC: 0.97 MG/DL (ref 0.5–1.4)
ERYTHROCYTE [DISTWIDTH] IN BLOOD BY AUTOMATED COUNT: 38 FL (ref 35.9–50)
GLUCOSE SERPL-MCNC: 141 MG/DL (ref 65–99)
HCT VFR BLD AUTO: 39.3 % (ref 42–52)
HGB BLD-MCNC: 13.3 G/DL (ref 14–18)
MCH RBC QN AUTO: 29.9 PG (ref 27–33)
MCHC RBC AUTO-ENTMCNC: 33.8 G/DL (ref 33.7–35.3)
MCV RBC AUTO: 88.5 FL (ref 81.4–97.8)
PLATELET # BLD AUTO: 255 K/UL (ref 164–446)
PMV BLD AUTO: 10.4 FL (ref 9–12.9)
POTASSIUM SERPL-SCNC: 4.8 MMOL/L (ref 3.6–5.5)
RBC # BLD AUTO: 4.42 M/UL (ref 4.7–6.1)
SODIUM SERPL-SCNC: 135 MMOL/L (ref 135–145)
WBC # BLD AUTO: 18.2 K/UL (ref 4.8–10.8)

## 2020-05-27 PROCEDURE — 700111 HCHG RX REV CODE 636 W/ 250 OVERRIDE (IP): Performed by: NURSE PRACTITIONER

## 2020-05-27 PROCEDURE — 700112 HCHG RX REV CODE 229: Performed by: NURSE PRACTITIONER

## 2020-05-27 PROCEDURE — 36415 COLL VENOUS BLD VENIPUNCTURE: CPT

## 2020-05-27 PROCEDURE — 700102 HCHG RX REV CODE 250 W/ 637 OVERRIDE(OP): Performed by: CLINICAL NURSE SPECIALIST

## 2020-05-27 PROCEDURE — 85027 COMPLETE CBC AUTOMATED: CPT

## 2020-05-27 PROCEDURE — 700102 HCHG RX REV CODE 250 W/ 637 OVERRIDE(OP): Performed by: NURSE PRACTITIONER

## 2020-05-27 PROCEDURE — A9270 NON-COVERED ITEM OR SERVICE: HCPCS | Performed by: NURSE PRACTITIONER

## 2020-05-27 PROCEDURE — A9270 NON-COVERED ITEM OR SERVICE: HCPCS | Performed by: CLINICAL NURSE SPECIALIST

## 2020-05-27 PROCEDURE — 770006 HCHG ROOM/CARE - MED/SURG/GYN SEMI*

## 2020-05-27 PROCEDURE — 700101 HCHG RX REV CODE 250: Performed by: NURSE PRACTITIONER

## 2020-05-27 PROCEDURE — 80048 BASIC METABOLIC PNL TOTAL CA: CPT

## 2020-05-27 PROCEDURE — 97162 PT EVAL MOD COMPLEX 30 MIN: CPT

## 2020-05-27 PROCEDURE — 97165 OT EVAL LOW COMPLEX 30 MIN: CPT

## 2020-05-27 RX ORDER — OXYCODONE HYDROCHLORIDE 5 MG/1
5 TABLET ORAL EVERY 4 HOURS PRN
Status: DISCONTINUED | OUTPATIENT
Start: 2020-05-27 | End: 2020-05-29

## 2020-05-27 RX ORDER — OXYCODONE HYDROCHLORIDE 10 MG/1
10 TABLET ORAL EVERY 4 HOURS PRN
Status: DISCONTINUED | OUTPATIENT
Start: 2020-05-27 | End: 2020-05-29

## 2020-05-27 RX ADMIN — POTASSIUM CHLORIDE AND SODIUM CHLORIDE: 900; 150 INJECTION, SOLUTION INTRAVENOUS at 04:07

## 2020-05-27 RX ADMIN — OXYCODONE HYDROCHLORIDE 10 MG: 10 TABLET ORAL at 09:46

## 2020-05-27 RX ADMIN — LORATADINE 10 MG: 10 TABLET ORAL at 04:06

## 2020-05-27 RX ADMIN — DOCUSATE SODIUM 100 MG: 100 CAPSULE, LIQUID FILLED ORAL at 18:36

## 2020-05-27 RX ADMIN — DOCUSATE SODIUM 50 MG AND SENNOSIDES 8.6 MG 1 TABLET: 8.6; 5 TABLET, FILM COATED ORAL at 21:20

## 2020-05-27 RX ADMIN — OXYCODONE HYDROCHLORIDE 10 MG: 10 TABLET ORAL at 14:36

## 2020-05-27 RX ADMIN — MULTIPLE VITAMINS W/ MINERALS TAB 1 TABLET: TAB at 04:06

## 2020-05-27 RX ADMIN — OXYCODONE HYDROCHLORIDE 10 MG: 10 TABLET ORAL at 18:36

## 2020-05-27 RX ADMIN — DOCUSATE SODIUM 100 MG: 100 CAPSULE, LIQUID FILLED ORAL at 04:06

## 2020-05-27 RX ADMIN — OXYCODONE HYDROCHLORIDE 10 MG: 10 TABLET ORAL at 22:40

## 2020-05-27 RX ADMIN — CEFAZOLIN SODIUM 2 G: 2 INJECTION, SOLUTION INTRAVENOUS at 04:06

## 2020-05-27 ASSESSMENT — COGNITIVE AND FUNCTIONAL STATUS - GENERAL
DRESSING REGULAR LOWER BODY CLOTHING: A LOT
DRESSING REGULAR UPPER BODY CLOTHING: A LITTLE
SUGGESTED CMS G CODE MODIFIER DAILY ACTIVITY: CJ
SUGGESTED CMS G CODE MODIFIER MOBILITY: CK
SUGGESTED CMS G CODE MODIFIER DAILY ACTIVITY: CK
MOBILITY SCORE: 18
STANDING UP FROM CHAIR USING ARMS: A LITTLE
SUGGESTED CMS G CODE MODIFIER MOBILITY: CL
MOBILITY SCORE: 12
HELP NEEDED FOR BATHING: A LOT
CLIMB 3 TO 5 STEPS WITH RAILING: A LITTLE
DRESSING REGULAR LOWER BODY CLOTHING: A LITTLE
DAILY ACTIVITIY SCORE: 21
DAILY ACTIVITIY SCORE: 19
TURNING FROM BACK TO SIDE WHILE IN FLAT BAD: A LOT
WALKING IN HOSPITAL ROOM: A LITTLE
TURNING FROM BACK TO SIDE WHILE IN FLAT BAD: A LITTLE
MOVING TO AND FROM BED TO CHAIR: UNABLE
WALKING IN HOSPITAL ROOM: A LITTLE
STANDING UP FROM CHAIR USING ARMS: A LITTLE
MOVING FROM LYING ON BACK TO SITTING ON SIDE OF FLAT BED: UNABLE
HELP NEEDED FOR BATHING: A LITTLE
CLIMB 3 TO 5 STEPS WITH RAILING: A LOT
MOVING TO AND FROM BED TO CHAIR: A LITTLE
MOVING FROM LYING ON BACK TO SITTING ON SIDE OF FLAT BED: A LITTLE
DRESSING REGULAR UPPER BODY CLOTHING: A LITTLE

## 2020-05-27 ASSESSMENT — LIFESTYLE VARIABLES
HOW MANY TIMES IN THE PAST YEAR HAVE YOU HAD 5 OR MORE DRINKS IN A DAY: 0
EVER_SMOKED: YES
ALCOHOL_USE: YES
ON A TYPICAL DAY WHEN YOU DRINK ALCOHOL HOW MANY DRINKS DO YOU HAVE: 0
TOTAL SCORE: 0
DOES PATIENT WANT TO STOP DRINKING: NO
HAVE PEOPLE ANNOYED YOU BY CRITICIZING YOUR DRINKING: NO
EVER HAD A DRINK FIRST THING IN THE MORNING TO STEADY YOUR NERVES TO GET RID OF A HANGOVER: NO
CONSUMPTION TOTAL: NEGATIVE
EVER FELT BAD OR GUILTY ABOUT YOUR DRINKING: NO
HAVE YOU EVER FELT YOU SHOULD CUT DOWN ON YOUR DRINKING: NO
AVERAGE NUMBER OF DAYS PER WEEK YOU HAVE A DRINK CONTAINING ALCOHOL: 2
TOTAL SCORE: 0
TOTAL SCORE: 0

## 2020-05-27 ASSESSMENT — GAIT ASSESSMENTS
ASSISTIVE DEVICE: FRONT WHEEL WALKER
DEVIATION: BRADYKINETIC;SHUFFLED GAIT
DISTANCE (FEET): 35
GAIT LEVEL OF ASSIST: MINIMAL ASSIST

## 2020-05-27 ASSESSMENT — PATIENT HEALTH QUESTIONNAIRE - PHQ9
SUM OF ALL RESPONSES TO PHQ9 QUESTIONS 1 AND 2: 0
2. FEELING DOWN, DEPRESSED, IRRITABLE, OR HOPELESS: NOT AT ALL
1. LITTLE INTEREST OR PLEASURE IN DOING THINGS: NOT AT ALL

## 2020-05-27 ASSESSMENT — ACTIVITIES OF DAILY LIVING (ADL): TOILETING: INDEPENDENT

## 2020-05-27 NOTE — THERAPY
Occupational Therapy   Initial Evaluation     Patient Name: Devaughn Amador  Age:  54 y.o., Sex:  male  Medical Record #: [id number]  Today's Date: 5/27/2020     Precautions  Precautions: Spinal / Back Precautions , Lumbosacral Orthosis  Comments: LSO when OOB    Assessment  Patient is 54 y.o. male with a diagnosis of L5-S1 decompressive laminectomy. He lives alone, and was I with all ADLs/IADLs, ambulated without AD, and was driving at [facility]. Pt currently presents with post op pain, decreased activity tolerance, and reduced balance -- all limiting pt's ability to perform ADLs and functional txfs independently. Pt will be followed by acute OT while in house.    Plan    Recommend Occupational Therapy 4 times per week until therapy goals are met for the following treatments:  Adaptive Equipment, Community Re-integration, Manual Therapy Techniques, Neuro Re-Education / Balance, Orthotics Treatment, Self Care/Activities of Daily Living, Therapeutic Activities and Therapeutic Exercises.    Discharge recommendations: Recommend post-acute placement for additional occupational therapy services prior to discharge home.       05/27/20 1046   Prior Living Situation   Prior Services None   Housing / Facility 1 Story Apartment / Condo   Steps Into Home 0   Steps In Home 0   Bathroom Set up Bathtub / Shower Combination   Equipment Owned None   Lives with - Patient's Self Care Capacity Alone and Able to Care For Self   Comments pt lives alone, but reports he has a neighbor/friend who can assist upon dc   Prior Level of ADL Function   Self Feeding Independent   Grooming / Hygiene Independent   Bathing Independent   Dressing Independent   Toileting Independent   Prior Level of IADL Function   Medication Management Independent   Laundry Independent   Kitchen Mobility Independent   Finances Independent   Home Management Independent   Shopping Independent   Prior Level Of Mobility Independent Without Device in Home;Independent Without  Device in Community   Driving / Transportation Driving Independent   Occupation (Pre-Hospital Vocational) Employed Full Time  (recently furloughed, was a )   Cognition    Level of Consciousness Alert   Comments pleasant and cooperative, but odd affect   Bed Mobility    Supine to Sit   (received up in chair, left up in chair post session)   ADL Assessment   Eating Independent   Grooming Standing;Supervision   Upper Body Dressing Minimal Assist   Toileting Supervision   Functional Mobility   Sit to Stand Minimal Assist   Bed, Chair, Wheelchair Transfer Minimal Assist   Toilet Transfers Minimal Assist   Transfer Method Stand Step  (with FWW)   Mobility in room with FWW   Short Term Goals   Short Term Goal # 1 pt will complete LB dressing with spv   Short Term Goal # 2 pt will complete ADL txfs with spv   Short Term Goal # 3 pt will don/doff LSO brace with spv   Anticipated Discharge Equipment   DC Equipment Front-Wheel Walker;Raised Toilet Seat Without Arms

## 2020-05-27 NOTE — PROGRESS NOTES
LSO at beside. Nursing can call traction at ext. [phone] once patient is awake and ready to get out of bed

## 2020-05-27 NOTE — DISCHARGE PLANNING
Care Transition Team Assessment  In the case of an emergency, pt's legal NOK is brother Huey Bates     RNCM met with pt at bedside and obtained the information used in this assessment. Pt verified accuracy of facesheet. Pt lives in a single story apt alone.   Pt uses [facility] [address]. Prior to current hospitalization, pt was completely independent in ADLS/IADLS. Pt drives and is able to attend necessary MD appointments.. Pt has a limited support system. Pt denies any hx of substance use and denies any dx of mh. Pt is likely to return to home, will need fww.    Information Source:PT  Orientation : Oriented x 4  Information Given By: Patient  Informant's Name: ([facility])  Who is responsible for making decisions for patient? : Patient         Elopement Risk  Legal Hold: No  Ambulatory or Self Mobile in Wheelchair: Yes  Disoriented: No    Interdisciplinary Discharge Planning  Does Admitting Nurse Feel This Could be a Complex Discharge?: No  Primary Care Physician: (none)  Lives with - Patient's Self Care Capacity: Alone and Able to Care For Self  Patient or legal guardian wants to designate a caregiver (see row info): No  Support Systems: Friends / Neighbors  Housing / Facility: 1 Story Apartment / Condo  Do You Take your Prescribed Medications Regularly: Yes  Mobility Issues: Yes  Prior Services: None  Durable Medical Equipment: (cane)    Discharge Preparedness  Prior Functional Level: Ambulatory, Drives Self, Independent with Activities of Daily Living, Independent with Medication Management  Difficulity with ADLs: None  Difficulity with IADLs: None    Functional Assesment  Prior Functional Level: Ambulatory, Drives Self, Independent with Activities of Daily Living, Independent with Medication Management    Finances  Prescription Coverage: Yes    Vision / Hearing Impairment  Vision Impairment : No  Hearing Impairment : No              Domestic Abuse  Have you ever been the victim of abuse or  violence?: No  Physical Abuse or Sexual Abuse: No  Verbal Abuse or Emotional Abuse: No  Possible Abuse Reported to:: Not Applicable    Psychological Assessment  History of Substance Abuse: None  History of Psychiatric Problems: No         Anticipated Discharge Information  Anticipated discharge disposition: Home

## 2020-05-27 NOTE — THERAPY
Physical Therapy   Initial Evaluation     Patient Name: Devaughn Amador  Age:  54 y.o., Sex:  male  Medical Record #: [id number]  Today's Date: 5/27/2020     Precautions: (P) Spinal / Back Precautions , Lumbosacral Orthosis    Assessment  Patient is 54 y.o. male presenting to physical therapy s/p L5-S1 TLIF and L2-3 discectomy; LSO when OOB > 5 min. Pt provided with spinal precautions handout and educated on brace wear. Pt demonstrated impairments in functional mobility, balance, gait and activity tolerance due to post op pain, pt has PCA in use. Pt tolerated short distance ambulation x35 ft, LE's became tremulous and pt did not feel comfortable with ambulation back to room. PT to follow while in house.    Plan    Recommend Physical Therapy 5 times per week until therapy goals are met for the following treatments:  Bed Mobility, Equipment, Gait Training, Neuro Re-Education / Balance, Self Care/Home Evaluation, Stair Training, Therapeutic Activities and Therapeutic Exercises    Discharge recommendations:  Currently, Recommend post-acute placement for continued physical therapy services prior to discharge home.              05/27/20 1004   Prior Living Situation   Prior Services Home-Independent   Housing / Facility 1 Story Apartment / Condo   Steps Into Home 0   Steps In Home 0   Equipment Owned None   Lives with - Patient's Self Care Capacity Alone and Able to Care For Self   Comments reports he has a neighbor who can check in as needed   Prior Level of Functional Mobility   Bed Mobility Independent   Transfer Status Independent   Ambulation Independent   Distance Ambulation (Feet)   (community)   Assistive Devices Used None   Stairs Independent   Comments reports he works as a , is currently furloughed   Balance Assessment   Sitting Balance (Static) Fair +   Sitting Balance (Dynamic) Fair   Standing Balance (Static) Fair   Standing Balance (Dynamic) Fair -   Weight Shift Sitting Fair   Weight Shift Standing  Fair   Comments w/ FWW   Gait Analysis   Gait Level Of Assist Minimal Assist   Assistive Device Front Wheel Walker   Distance (Feet) 35   Deviation Bradykinetic;Shuffled Gait   Weight Bearing Status No restrictions   Comments fwd flexed with high reliance of UEs on FWW. B LE tremulous and required seated rest. Did not feel comfortable with ambulating back to room   Bed Mobility    Supine to Sit   (in chair pre/post PT session)   Scooting Supervised   Functional Mobility   Sit to Stand Minimal Assist   Bed, Chair, Wheelchair Transfer Minimal Assist   Transfer Method Stand Pivot   Patient / Family Goals    Patient / Family Goal #1 to return home   Short Term Goals    Short Term Goal # 1 pt will perform supine <> sit with HOB flat, no railing and log roll with SPV in 6 visits   Short Term Goal # 2 pt will perform sit <> stand and functional transfers with LRAD and SPV to improve mobility independence in 6 visits   Short Term Goal # 3 pt will ambulate > 250 ft with LRAD and SPV to access community in 6 visits   Short Term Goal # 4 pt will negotiate 1 curb step as needed to access home/community environments with LRAD and SPV in 6 visits   Education Group   Additional Comments adjusted and tightened LSO standing against bathroom door

## 2020-05-27 NOTE — PROGRESS NOTES
Neurosurgery Progress Note    Subjective:  Seen by Dr Oscar, c/o back pain, pca, tse    Exam:  Strength intact  Inc c/d [id number]    BP  Min: 92/68  Max: 138/72  Pulse  Av.6  Min: 80  Max: 100  Resp  Avg: 15.2  Min: 10  Max: 18  Temp  Av.3 °C (97.3 °F)  Min: 36.1 °C (97 °F)  Max: 36.6 °C (97.8 °F)  SpO2  Av.9 %  Min: 92 %  Max: 99 %    No data recorded    Recent Labs     20  000   WBC 18.2*   RBC 4.42*   HEMOGLOBIN 13.3*   HEMATOCRIT 39.3*   MCV 88.5   MCH 29.9   MCHC 33.8   RDW 38.0   PLATELETCT 255   MPV 10.4     Recent Labs     20  0007   SODIUM 135   POTASSIUM 4.8   CHLORIDE 100   CO2 23   GLUCOSE 141*   BUN 16   CREATININE 0.97   CALCIUM 8.8               Intake/Output       20 0700 - 20 0659 20 0700 - 20 0659      8265-1453 4922-3985 Total 3788-6012 4300-3377 Total       Intake    P.O.  --  400 400  --  -- --    P.O. -- 400 400 -- -- --    I.V.  1200  -- 1200  --  -- --    Volume (mL) (lactated ringers infusion) 1200 -- 1200 -- -- --    Total Intake 7435 754 2760 -- -- --       Output    Urine  50  1200 1250  --  -- --    Output (mL) ([REMOVED] Urethral Catheter Latex 16 Fr.) 50 1200 1250 -- -- --    Drains  60  65 125  --  -- --    Output (mL) (Closed/Suction Drain 1 Posterior Back Hemovac) 60 65 125 -- -- --    Stool  --  -- --  --  -- --    Number of Times Stooled -- 0 x 0 x -- -- --    Blood  50  -- 50  --  -- --    Est. Blood Loss 50 -- 50 -- -- --    Total Output 160 1265 1425 -- -- --       Net I/O     1040 -865 175 -- -- --            Intake/Output Summary (Last 24 hours) at 2020 0822  Last data filed at 2020 0600  Gross per 24 hour   Intake 1600 ml   Output 1425 ml   Net 175 ml            • diphenhydrAMINE  25-50 mg QHS PRN   • loratadine  10 mg DAILY   • therapeutic multivitamin-minerals  1 Tab DAILY   • tizanidine  4 mg Q6HRS PRN   • Pharmacy Consult Request  1 Each PHARMACY TO DOSE   • MD ALERT...DO NOT ADMINISTER NSAIDS or ASPIRIN  unless ORDERED By Neurosurgery  1 Each PRN   • docusate sodium  100 mg BID   • senna-docusate  1 Tab Nightly   • senna-docusate  1 Tab Q24HRS PRN   • polyethylene glycol/lytes  1 Packet BID PRN   • magnesium hydroxide  30 mL QDAY PRN   • bisacodyl  10 mg Q24HRS PRN   • fleet  1 Each Once PRN   • 0.9 % NaCl with KCl 20 mEq 1,000 mL   Continuous   • HYDROmorphone   Continuous   • HYDROmorphone  0.5 mg Once PRN   • diphenhydrAMINE  25 mg Q6HRS PRN    Or   • diphenhydrAMINE  25 mg Q6HRS PRN   • ondansetron  4 mg Q4HRS PRN   • ondansetron  4 mg Q4HRS PRN   • promethazine  12.5-25 mg Q4HRS PRN   • promethazine  12.5-25 mg Q4HRS PRN   • prochlorperazine  5-10 mg Q4HRS PRN   • labetalol  10 mg Q HOUR PRN   • hydrALAZINE  10 mg Q HOUR PRN       Assessment and Plan:    POD# 1 L5-S1 TLIF, L2-3 disc  Chemical prophylactic DVT therapy: No  Start date/time: n/a    NM intact  Pain control- will change to PO  Bowel/bladder protocol  Cont hvac  amb w/ LSO on when oob > 5 min/PT/OT

## 2020-05-27 NOTE — OP REPORT
DATE OF SERVICE:  05/26/2020    PREOPERATIVE DIAGNOSES:  1.  Left-sided lumbar 2-3 herniated disk with motor and sensory radiculopathy,   recalcitrant to nonoperative measures.  2.  L5-S1 spondylolisthesis with recalcitrant back pain and radiculopathy.    PROCEDURES:  1.  L5 laminectomy and decompression of lumbar 5 roots bilaterally.  2.  Left-sided lumbar 2-3 microdiskectomy under separate incision.  3.  S1 laminectomy and decompression of S1 roots bilaterally.  4.  Total facetectomy bilaterally at L5-S1.  5.  Right-sided diskectomy at L5-S1 with interbody arthrodesis.  6.  Implantation of Globus expandable cage at L5-S1.  7.  Open reduction of spondylolisthesis.  8.  Posterolateral arthrodesis, L5 and S1.  9.  Use of locally harvested morselized autograft.  10.  Use of allograft.  11.  Pedicle screw instrumentation at L5-S1 using Medtronic Solera screws.  12.  Use of operative microscope for the diskectomy.    SURGEON:  Dutch Oscar MD    ASSISTANT:  LISANDRO Alba    ANESTHESIA:  General.    COMPLICATIONS:  None.    ESTIMATED BLOOD LOSS:  100 mL.    DESCRIPTION OF PROCEDURE:  The patient was brought to the operating room and   identified in the usual fashion.  General endotracheal anesthesia was induced   by the anesthesia team.  Patient was then placed prone on the Ab table   with bolsters.  All pressure points were meticulously padded.  We marked the   L5-S1 incision using fluoroscopic guidance and then marked the L2-L3   diskectomy incision.  Patient was then prepped and draped in the usual sterile   fashion.  Local anesthesia was infiltrated in subcutaneous tissue.  We first   addressed the L5-S1 area.  A 10 blade was used to incise the skin.  Dissection   was carried down in a subperiosteal fashion bilaterally.  Retractors were put   in place.  Kocher was placed in the transverse process of L5.  Film was taken   confirming that we were at the correct level.  This was then marked with  a   blue marking pen bilaterally.  We then adjusted the retractors and performed a   laminectomy of L5 and S1 along with total facetectomy using a combination of   Leksell rongeur, high-speed air drill, Kerrison 2, 3, and 4 punches.  We   identified the L5 roots bilaterally and there was significant   spondylolisthesis.  We then used a nerve root retractor to retract the thecal   sac medially from the right side.  A 15 blade was used to incise the disk   space.  Bipolar electrocautery was used for hemostasis in the epidural veins.    We then used progressively larger penelope while great care was taken not to   damage the L5 nerve root.  We then used open box curettes to prepare the   endplates.  Copious amounts of antibiotic irrigation was injected into the   disk space to free up any additional free fragments.  These were then removed   with an alley and a pituitary.  We then packed locally harvested morcellized   autograft into the disk space and then implanted the cage and gently   countersunk it using a tamp.  This cage was of Globus expandable cage from   8-14 mm.  Film was taken confirming that it was the correct level and that it   looked to be in excellent position.  It was then expanded to 14 mm without   incident.  We then turned our attention to placing pedicle screws in the   following sequence of events.  High-speed air drill was used to drill a    hole.  A 2.8 mm drill was used to cannulate the pedicle.  Alba confirmed we   had no breach.  We then tapped the pedicle to the appropriate depth.  Again,   Alba confirmed we had no breach.  We then placed 6.5x50 mm screws at L5 and   6.5x45 mm screws at S1.  All screws had excellent purchase.  There were no   breaches.  We then decorticated the transverse processes and the sacral ala   bilaterally for the posterolateral arthrodesis.  We placed the appropriately   sized rods, which were left proud to the nuts at L5 and we used dual reducers   to  reduce the spondylolisthesis.  We then final tightened everything with a   little bit of compression to increase lordosis.  Film was taken confirming   that all the hardware looked to be in excellent position.  We then packed   locally harvested morselized autograft and allograft into the gutters   bilaterally.  We left vancomycin powder on top of the hardware.  We left a   Hemovac drain in the epidural space, brought out through a separate stab   incision.  We then closed the incision in layers and topped with staples and a   sterile dressing.    We then injected local anesthesia into the L2-L3 incision.  I moved to the   left side of the table.  A 15 blade was used to incise the skin.  We then   carried down in a subperiosteal fashion using Bovie electrocautery on the left   side only.  Retractor was put in place.  Upgoing curette was placed   underneath the lamina of L2 at the level of the L2-L3 disk space.  Film was   taken confirming that we were at the correct level.  This was then marked with   marking pen.  We then adjusted the retractors and brought in the operative   microscope.  We performed a standard hemilaminectomy of L2 using a high-speed   air drill, Kerrison 2 and 3 punches and upgoing curette to release ligamentum   flavum.  We identified the lateral border of the thecal sac.  Nerve root   retractor was used to retract the thecal sac medially.  Bipolar electrocautery   was used for hemostasis.  A 15 blade was used to incise the disk space.  We   then removed disk contents using combination of alley, pituitary and   downbiting curette, and antibiotic irrigation into the disk space to free up   any additional free fragments.  These were then removed with an alley and a   pituitary.  We performed generous foraminotomy of the L2 root and the L3 root   and confirmed this with double ball probe.  Copious amounts of antibiotic   irrigation was used to wash out the wound.  FloSeal with gentle tamponade was    used for hemostasis.  We then closed the incision in layers and topped with   staples and a sterile dressing.  There were no complications.  Patient   tolerated the procedure well.       ____________________________________     AKIL MONTENEGRO MD    CPD / NTS    DD:  05/26/2020 14:58:47  DT:  05/26/2020 18:28:50    D#:  [id number]  Job#:  [id number]

## 2020-05-28 LAB
ANION GAP SERPL CALC-SCNC: 12 MMOL/L (ref 7–16)
BUN SERPL-MCNC: 18 MG/DL (ref 8–22)
CALCIUM SERPL-MCNC: 8.8 MG/DL (ref 8.5–10.5)
CHLORIDE SERPL-SCNC: 96 MMOL/L (ref 96–112)
CO2 SERPL-SCNC: 25 MMOL/L (ref 20–33)
CREAT SERPL-MCNC: 1.01 MG/DL (ref 0.5–1.4)
ERYTHROCYTE [DISTWIDTH] IN BLOOD BY AUTOMATED COUNT: 39.4 FL (ref 35.9–50)
GLUCOSE SERPL-MCNC: 112 MG/DL (ref 65–99)
HCT VFR BLD AUTO: 37.2 % (ref 42–52)
HGB BLD-MCNC: 12.3 G/DL (ref 14–18)
MCH RBC QN AUTO: 29.9 PG (ref 27–33)
MCHC RBC AUTO-ENTMCNC: 33.1 G/DL (ref 33.7–35.3)
MCV RBC AUTO: 90.5 FL (ref 81.4–97.8)
PLATELET # BLD AUTO: 239 K/UL (ref 164–446)
PMV BLD AUTO: 10.5 FL (ref 9–12.9)
POTASSIUM SERPL-SCNC: 3.8 MMOL/L (ref 3.6–5.5)
RBC # BLD AUTO: 4.11 M/UL (ref 4.7–6.1)
SODIUM SERPL-SCNC: 133 MMOL/L (ref 135–145)
WBC # BLD AUTO: 16.1 K/UL (ref 4.8–10.8)

## 2020-05-28 PROCEDURE — 85027 COMPLETE CBC AUTOMATED: CPT

## 2020-05-28 PROCEDURE — 36415 COLL VENOUS BLD VENIPUNCTURE: CPT

## 2020-05-28 PROCEDURE — 770006 HCHG ROOM/CARE - MED/SURG/GYN SEMI*

## 2020-05-28 PROCEDURE — A9270 NON-COVERED ITEM OR SERVICE: HCPCS | Performed by: CLINICAL NURSE SPECIALIST

## 2020-05-28 PROCEDURE — 80048 BASIC METABOLIC PNL TOTAL CA: CPT

## 2020-05-28 PROCEDURE — 700112 HCHG RX REV CODE 229: Performed by: NURSE PRACTITIONER

## 2020-05-28 PROCEDURE — 97530 THERAPEUTIC ACTIVITIES: CPT | Mod: CQ

## 2020-05-28 PROCEDURE — A9270 NON-COVERED ITEM OR SERVICE: HCPCS | Performed by: NURSE PRACTITIONER

## 2020-05-28 PROCEDURE — 700102 HCHG RX REV CODE 250 W/ 637 OVERRIDE(OP): Performed by: NURSE PRACTITIONER

## 2020-05-28 PROCEDURE — 700102 HCHG RX REV CODE 250 W/ 637 OVERRIDE(OP): Performed by: CLINICAL NURSE SPECIALIST

## 2020-05-28 PROCEDURE — 97116 GAIT TRAINING THERAPY: CPT | Mod: CQ

## 2020-05-28 RX ORDER — CYCLOBENZAPRINE HCL 10 MG
10 TABLET ORAL 3 TIMES DAILY PRN
Status: DISCONTINUED | OUTPATIENT
Start: 2020-05-28 | End: 2020-06-01 | Stop reason: HOSPADM

## 2020-05-28 RX ORDER — TIZANIDINE 4 MG/1
4 TABLET ORAL EVERY 8 HOURS
Status: DISCONTINUED | OUTPATIENT
Start: 2020-05-28 | End: 2020-06-01 | Stop reason: HOSPADM

## 2020-05-28 RX ADMIN — POLYETHYLENE GLYCOL 3350 1 PACKET: 17 POWDER, FOR SOLUTION ORAL at 17:20

## 2020-05-28 RX ADMIN — POLYETHYLENE GLYCOL 3350 1 PACKET: 17 POWDER, FOR SOLUTION ORAL at 05:05

## 2020-05-28 RX ADMIN — TIZANIDINE 4 MG: 4 TABLET ORAL at 07:45

## 2020-05-28 RX ADMIN — LORATADINE 10 MG: 10 TABLET ORAL at 05:05

## 2020-05-28 RX ADMIN — DOCUSATE SODIUM 100 MG: 100 CAPSULE, LIQUID FILLED ORAL at 05:05

## 2020-05-28 RX ADMIN — DOCUSATE SODIUM 100 MG: 100 CAPSULE, LIQUID FILLED ORAL at 17:20

## 2020-05-28 RX ADMIN — TIZANIDINE 4 MG: 4 TABLET ORAL at 14:02

## 2020-05-28 RX ADMIN — TIZANIDINE 4 MG: 4 TABLET ORAL at 23:02

## 2020-05-28 RX ADMIN — MAGNESIUM HYDROXIDE 30 ML: 400 SUSPENSION ORAL at 12:22

## 2020-05-28 RX ADMIN — MULTIPLE VITAMINS W/ MINERALS TAB 1 TABLET: TAB at 05:05

## 2020-05-28 RX ADMIN — OXYCODONE HYDROCHLORIDE 10 MG: 10 TABLET ORAL at 05:05

## 2020-05-28 RX ADMIN — CYCLOBENZAPRINE HYDROCHLORIDE 10 MG: 10 TABLET, FILM COATED ORAL at 17:24

## 2020-05-28 RX ADMIN — OXYCODONE HYDROCHLORIDE 10 MG: 10 TABLET ORAL at 23:02

## 2020-05-28 ASSESSMENT — COGNITIVE AND FUNCTIONAL STATUS - GENERAL
MOVING FROM LYING ON BACK TO SITTING ON SIDE OF FLAT BED: A LITTLE
STANDING UP FROM CHAIR USING ARMS: A LITTLE
CLIMB 3 TO 5 STEPS WITH RAILING: A LITTLE
WALKING IN HOSPITAL ROOM: A LITTLE
MOBILITY SCORE: 17
MOVING TO AND FROM BED TO CHAIR: A LOT
SUGGESTED CMS G CODE MODIFIER MOBILITY: CK
TURNING FROM BACK TO SIDE WHILE IN FLAT BAD: A LITTLE

## 2020-05-28 ASSESSMENT — GAIT ASSESSMENTS
DEVIATION: ANTALGIC;BRADYKINETIC;SHUFFLED GAIT
ASSISTIVE DEVICE: FRONT WHEEL WALKER
DISTANCE (FEET): 85
GAIT LEVEL OF ASSIST: MINIMAL ASSIST

## 2020-05-28 NOTE — PROGRESS NOTES
Neurosurgery Progress Note    Subjective:  Up in chair, c/o left leg cramps all noc- just given tizandine, LBP controlled, amb only briefly, voiding, eating, no bm yet    Exam:  Strength intact except too painful to assess left quad  Inc c/d hvac 50    BP  Min: 108/73  Max: 119/83  Pulse  Av.8  Min: 89  Max: 94  Resp  Av.5  Min: 17  Max: 18  Temp  Av.8 °C (98.2 °F)  Min: 36.1 °C (97 °F)  Max: 37.6 °C (99.6 °F)  SpO2  Av.5 %  Min: 93 %  Max: 98 %    No data recorded    Recent Labs     20  0007 20  0004   WBC 18.2* 16.1*   RBC 4.42* 4.11*   HEMOGLOBIN 13.3* 12.3*   HEMATOCRIT 39.3* 37.2*   MCV 88.5 90.5   MCH 29.9 29.9   MCHC 33.8 33.1*   RDW 38.0 39.4   PLATELETCT 255 239   MPV 10.4 10.5     Recent Labs     20  0007 20  0004   SODIUM 135 133*   POTASSIUM 4.8 3.8   CHLORIDE 100 96   CO2 23 25   GLUCOSE 141* 112*   BUN 16 18   CREATININE 0.97 1.01   CALCIUM 8.8 8.8               Intake/Output       20 0700 - 20 0659 20 07 - 20 0659       Total  Total       Intake    P.O.  580  -- 580  --  -- --    P.O. 580 -- 580 -- -- --    Total Intake 580 -- 580 -- -- --       Output    Urine  --  750 750  --  -- --    Number of Times Voided -- 3 x 3 x -- -- --    Urine Void (mL) -- 750 750 -- -- --    Drains  75  110 185  --  -- --    Output (mL) (Closed/Suction Drain 1 Posterior Back Hemovac) 75 110 185 -- -- --    Total Output 75 860 935 -- -- --       Net I/O     977 -198 -355 -- -- --            Intake/Output Summary (Last 24 hours) at 2020 0806  Last data filed at 2020 0410  Gross per 24 hour   Intake 580 ml   Output 935 ml   Net -355 ml            • tizanidine  4 mg Q8HRS   • cyclobenzaprine  10 mg TID PRN   • oxyCODONE immediate-release  5 mg Q4HRS PRN    Or   • oxyCODONE immediate-release  10 mg Q4HRS PRN   • diphenhydrAMINE  25-50 mg QHS PRN   • loratadine  10 mg DAILY   • therapeutic multivitamin-minerals   1 Tab DAILY   • Pharmacy Consult Request  1 Each PHARMACY TO DOSE   • MD ALERT...DO NOT ADMINISTER NSAIDS or ASPIRIN unless ORDERED By Neurosurgery  1 Each PRN   • docusate sodium  100 mg BID   • senna-docusate  1 Tab Nightly   • senna-docusate  1 Tab Q24HRS PRN   • polyethylene glycol/lytes  1 Packet BID PRN   • magnesium hydroxide  30 mL QDAY PRN   • bisacodyl  10 mg Q24HRS PRN   • fleet  1 Each Once PRN   • 0.9 % NaCl with KCl 20 mEq 1,000 mL   Continuous   • HYDROmorphone  0.5 mg Once PRN   • diphenhydrAMINE  25 mg Q6HRS PRN    Or   • diphenhydrAMINE  25 mg Q6HRS PRN   • ondansetron  4 mg Q4HRS PRN   • ondansetron  4 mg Q4HRS PRN   • promethazine  12.5-25 mg Q4HRS PRN   • promethazine  12.5-25 mg Q4HRS PRN   • prochlorperazine  5-10 mg Q4HRS PRN   • labetalol  10 mg Q HOUR PRN   • hydrALAZINE  10 mg Q HOUR PRN       Assessment and Plan:    POD# 2 L5-S1 TLIF, L2-3 disc  Chemical prophylactic DVT therapy: No  Start date/time: n/a    NM as abovle  Left leg cramp- will schedule tizanidine q 8 and add prn flexeril  Pain control- on orals  Bowel protocol  Cont hvac until <30 in 8  amb w/ LSO on when oob > 5 min/PT/OT   possibly home tomorrow if drain out, pain controlled, amb ok

## 2020-05-28 NOTE — CARE PLAN
Problem: Knowledge Deficit  Goal: Knowledge of disease process/condition, treatment plan, diagnostic tests, and medications will improve  Outcome: PROGRESSING AS EXPECTED  Note: Patient updated on plan of care, questions and concerns were addressed     Problem: Pain Management  Goal: Pain level will decrease to patient's comfort goal  Outcome: PROGRESSING AS EXPECTED  Note: Pain managed to a tolerable level through repositioning, rest, and prn medications

## 2020-05-29 ENCOUNTER — HOSPITAL ENCOUNTER (INPATIENT)
Facility: REHABILITATION | Age: 55
End: 2020-05-29
Attending: PHYSICAL MEDICINE & REHABILITATION | Admitting: PHYSICAL MEDICINE & REHABILITATION
Payer: COMMERCIAL

## 2020-05-29 PROCEDURE — 97530 THERAPEUTIC ACTIVITIES: CPT | Mod: CQ

## 2020-05-29 PROCEDURE — 770006 HCHG ROOM/CARE - MED/SURG/GYN SEMI*

## 2020-05-29 PROCEDURE — 99254 IP/OBS CNSLTJ NEW/EST MOD 60: CPT | Performed by: PHYSICAL MEDICINE & REHABILITATION

## 2020-05-29 PROCEDURE — A9270 NON-COVERED ITEM OR SERVICE: HCPCS | Performed by: CLINICAL NURSE SPECIALIST

## 2020-05-29 PROCEDURE — 700102 HCHG RX REV CODE 250 W/ 637 OVERRIDE(OP): Performed by: NURSE PRACTITIONER

## 2020-05-29 PROCEDURE — 700112 HCHG RX REV CODE 229: Performed by: NURSE PRACTITIONER

## 2020-05-29 PROCEDURE — 700111 HCHG RX REV CODE 636 W/ 250 OVERRIDE (IP): Performed by: CLINICAL NURSE SPECIALIST

## 2020-05-29 PROCEDURE — 97116 GAIT TRAINING THERAPY: CPT | Mod: CQ

## 2020-05-29 PROCEDURE — A9270 NON-COVERED ITEM OR SERVICE: HCPCS | Performed by: NURSE PRACTITIONER

## 2020-05-29 PROCEDURE — 94760 N-INVAS EAR/PLS OXIMETRY 1: CPT

## 2020-05-29 PROCEDURE — 700102 HCHG RX REV CODE 250 W/ 637 OVERRIDE(OP): Performed by: CLINICAL NURSE SPECIALIST

## 2020-05-29 RX ORDER — FAMOTIDINE 20 MG/1
20 TABLET, FILM COATED ORAL 2 TIMES DAILY
Status: DISCONTINUED | OUTPATIENT
Start: 2020-05-29 | End: 2020-06-01 | Stop reason: HOSPADM

## 2020-05-29 RX ORDER — PSEUDOEPHEDRINE HCL 30 MG
100 TABLET ORAL 2 TIMES DAILY
Qty: 60 CAP
Start: 2020-05-29 | End: 2020-06-05

## 2020-05-29 RX ORDER — GABAPENTIN 300 MG/1
300 CAPSULE ORAL 3 TIMES DAILY
Status: DISCONTINUED | OUTPATIENT
Start: 2020-05-29 | End: 2020-05-29

## 2020-05-29 RX ORDER — ENEMA 19; 7 G/133ML; G/133ML
1 ENEMA RECTAL
Start: 2020-05-29 | End: 2020-06-05

## 2020-05-29 RX ORDER — BISACODYL 10 MG
10 SUPPOSITORY, RECTAL RECTAL
Refills: 0
Start: 2020-05-29

## 2020-05-29 RX ORDER — DEXAMETHASONE SODIUM PHOSPHATE 4 MG/ML
4 INJECTION, SOLUTION INTRA-ARTICULAR; INTRALESIONAL; INTRAMUSCULAR; INTRAVENOUS; SOFT TISSUE EVERY 6 HOURS
Status: COMPLETED | OUTPATIENT
Start: 2020-05-29 | End: 2020-05-30

## 2020-05-29 RX ORDER — CYCLOBENZAPRINE HCL 10 MG
10 TABLET ORAL 3 TIMES DAILY PRN
Qty: 30 TAB | Refills: 0
Start: 2020-05-29

## 2020-05-29 RX ORDER — HYDROCODONE BITARTRATE AND ACETAMINOPHEN 5; 325 MG/1; MG/1
1 TABLET ORAL EVERY 4 HOURS PRN
Status: DISCONTINUED | OUTPATIENT
Start: 2020-05-29 | End: 2020-06-01 | Stop reason: HOSPADM

## 2020-05-29 RX ADMIN — DOCUSATE SODIUM 100 MG: 100 CAPSULE, LIQUID FILLED ORAL at 06:16

## 2020-05-29 RX ADMIN — DEXAMETHASONE SODIUM PHOSPHATE 4 MG: 4 INJECTION, SOLUTION INTRA-ARTICULAR; INTRALESIONAL; INTRAMUSCULAR; INTRAVENOUS; SOFT TISSUE at 20:06

## 2020-05-29 RX ADMIN — POLYETHYLENE GLYCOL 3350 1 PACKET: 17 POWDER, FOR SOLUTION ORAL at 06:16

## 2020-05-29 RX ADMIN — MULTIPLE VITAMINS W/ MINERALS TAB 1 TABLET: TAB at 06:16

## 2020-05-29 RX ADMIN — FAMOTIDINE 20 MG: 20 TABLET, FILM COATED ORAL at 17:56

## 2020-05-29 RX ADMIN — OXYCODONE HYDROCHLORIDE 5 MG: 5 TABLET ORAL at 06:16

## 2020-05-29 RX ADMIN — TIZANIDINE 4 MG: 4 TABLET ORAL at 21:48

## 2020-05-29 RX ADMIN — TIZANIDINE 4 MG: 4 TABLET ORAL at 06:16

## 2020-05-29 RX ADMIN — MAGNESIUM CITRATE 296 ML: 1.75 LIQUID ORAL at 09:00

## 2020-05-29 RX ADMIN — DEXAMETHASONE SODIUM PHOSPHATE 4 MG: 4 INJECTION, SOLUTION INTRA-ARTICULAR; INTRALESIONAL; INTRAMUSCULAR; INTRAVENOUS; SOFT TISSUE at 09:00

## 2020-05-29 RX ADMIN — DEXAMETHASONE SODIUM PHOSPHATE 4 MG: 4 INJECTION, SOLUTION INTRA-ARTICULAR; INTRALESIONAL; INTRAMUSCULAR; INTRAVENOUS; SOFT TISSUE at 14:31

## 2020-05-29 RX ADMIN — HYDROCODONE BITARTRATE AND ACETAMINOPHEN 1 TABLET: 5; 325 TABLET ORAL at 17:56

## 2020-05-29 RX ADMIN — CYCLOBENZAPRINE HYDROCHLORIDE 10 MG: 10 TABLET, FILM COATED ORAL at 18:05

## 2020-05-29 RX ADMIN — TIZANIDINE 4 MG: 4 TABLET ORAL at 14:31

## 2020-05-29 RX ADMIN — LORATADINE 10 MG: 10 TABLET ORAL at 06:16

## 2020-05-29 RX ADMIN — HYDROCODONE BITARTRATE AND ACETAMINOPHEN 1 TABLET: 5; 325 TABLET ORAL at 21:48

## 2020-05-29 RX ADMIN — FAMOTIDINE 20 MG: 20 TABLET, FILM COATED ORAL at 09:00

## 2020-05-29 ASSESSMENT — COGNITIVE AND FUNCTIONAL STATUS - GENERAL
MOVING FROM LYING ON BACK TO SITTING ON SIDE OF FLAT BED: A LITTLE
MOVING TO AND FROM BED TO CHAIR: A LITTLE
STANDING UP FROM CHAIR USING ARMS: A LITTLE
SUGGESTED CMS G CODE MODIFIER MOBILITY: CK
WALKING IN HOSPITAL ROOM: A LITTLE
TURNING FROM BACK TO SIDE WHILE IN FLAT BAD: A LITTLE
MOBILITY SCORE: 18
CLIMB 3 TO 5 STEPS WITH RAILING: A LITTLE

## 2020-05-29 ASSESSMENT — GAIT ASSESSMENTS
GAIT LEVEL OF ASSIST: MINIMAL ASSIST
ASSISTIVE DEVICE: FRONT WHEEL WALKER
DEVIATION: ANTALGIC;STEP TO;BRADYKINETIC;SHUFFLED GAIT
DISTANCE (FEET): 200

## 2020-05-29 NOTE — CONSULTS
Physical Medicine and Rehabilitation Consultation         Initial Consult      Date of Consultation: 5/29/2020  Consulting provider: LISANDRO Walton   Reason for consultation: assess for acute inpatient rehab appropriateness  LOS: 3 Day(s)    Chief complaint: L5 laminectomy     HPI: The patient is a 54 y.o. right hand dominant male with a past medical history of GERD, back pain, snoring;  who presented on 5/26/2020  7:38 AM for L5-S1 TLIF, and L2-3 discectomy.  Patient has been recovering well since that time.  He is now supervision with PT and only requiring min assist with OT.  Patient states he lives in a hotel room with a kitchenette, alone but has support through neighbors.  Patient has been instructed to wear an LSO when out of bed.  Patient states he has been struggling with constipation, however today had a bowel movement.  Patient does endorse intermittent paresthesias symptoms, as well as cramping down his legs.    ROS:  Pertinent positives are listed in HPI, all other systems reviewed and are negative    Social Hx:  Pre-morbidly, this patient lived in a single level home with 0 steps to enter, alone and able to care for self.   Tobacco: Smokes cigars  Alcohol: Occasional alcohol use  Drugs: Denies drugs    Current level of function:  The patient was evaluated by acute care Physical Therapy and Occupational Therapy; currently requiring supervision for mobility and minimal assistance for ADLs    PMH:  Past Medical History:   Diagnosis Date   • Heart burn    • Pain     back   • Snoring        PSH:  Past Surgical History:   Procedure Laterality Date   • PB LAMINOTOMY,LUMBAR DISK,1 INTRSP  5/26/2020    Procedure: LAMINECTOMY, SPINE, LUMBAR, WITH DISCECTOMY- L2-3 MICRODISC;  Surgeon: Dutch Oscar M.D.;  Location: SURGERY [facility];  Service: Neurosurgery   • LUMBAR FUSION POSTERIOR  5/26/2020    Procedure: FUSION, SPINE, LUMBAR, PLIF- L5-S1 TLIF;  Surgeon: Dutch Oscar M.D.;   Location: SURGERY [facility];  Service: Neurosurgery   • OTHER ORTHOPEDIC SURGERY      orif right leg, ankle   • OTHER ORTHOPEDIC SURGERY      orif right forearm   • OTHER ORTHOPEDIC SURGERY      removal of hardware       FHX:  Non-pertinent to today's issues    Medications:  Current Facility-Administered Medications   Medication Dose   • dexamethasone (DECADRON) injection 4 mg  4 mg   • famotidine (PEPCID) tablet 20 mg  20 mg   • HYDROcodone-acetaminophen (NORCO) 5-325 MG per tablet 1 Tab  1 Tab   • tizanidine (ZANAFLEX) tablet 4 mg  4 mg   • cyclobenzaprine (FLEXERIL) tablet 10 mg  10 mg   • diphenhydrAMINE (BENADRYL) tablet/capsule 25-50 mg  25-50 mg   • loratadine (CLARITIN) tablet 10 mg  10 mg   • therapeutic multivitamin-minerals (THERAGRAN-M) tablet 1 Tab  1 Tab   • Pharmacy Consult Request ...Pain Management Review 1 Each  1 Each   • MD ALERT...DO NOT ADMINISTER NSAIDS or ASPIRIN unless ORDERED By Neurosurgery 1 Each  1 Each   • docusate sodium (COLACE) capsule 100 mg  100 mg   • senna-docusate (PERICOLACE or SENOKOT S) 8.6-50 MG per tablet 1 Tab  1 Tab   • senna-docusate (PERICOLACE or SENOKOT S) 8.6-50 MG per tablet 1 Tab  1 Tab   • polyethylene glycol/lytes (MIRALAX) PACKET 1 Packet  1 Packet   • magnesium hydroxide (MILK OF MAGNESIA) suspension 30 mL  30 mL   • bisacodyl (DULCOLAX) suppository 10 mg  10 mg   • fleet enema 133 mL  1 Each   • 0.9 % NaCl with KCl 20 mEq infusion     • HYDROmorphone pf (DILAUDID) injection 0.5 mg  0.5 mg   • diphenhydrAMINE (BENADRYL) tablet/capsule 25 mg  25 mg    Or   • diphenhydrAMINE (BENADRYL) injection 25 mg  25 mg   • ondansetron (ZOFRAN) syringe/vial injection 4 mg  4 mg   • ondansetron (ZOFRAN ODT) dispertab 4 mg  4 mg   • promethazine (PHENERGAN) tablet 12.5-25 mg  12.5-25 mg   • promethazine (PHENERGAN) suppository 12.5-25 mg  12.5-25 mg   • prochlorperazine (COMPAZINE) injection 5-10 mg  5-10 mg   • labetalol (NORMODYNE/TRANDATE) injection 10 mg  10 mg   •  "hydrALAZINE (APRESOLINE) injection 10 mg  10 mg       Allergies:  Allergies   Allergen Reactions   • Morphine      Not an allergy, \"not having control\"  Per pt. nausea       Physical Exam:  Vitals: /57   Pulse 88   Temp 36.7 °C (98 °F) (Temporal)   Resp 16   Ht 1.778 m (5' 10\")   Wt 92.1 kg (203 lb 0.7 oz)   SpO2 94%   Gen: NAD  Head: NC/AT  Eyes/ Nose/ Mouth: PERRLA, moist mucous membranes  Cardio: RRR, no mumurs  Pulm: CTAB, with normal respiratory effort  Abd: Soft NTND, active bowel sounds,   Ext: No peripheral edema. No calf tenderness. No clubbing/cyanosis. +dorsal pedalis pulses bilaterally.    Mental status:  A&Ox4 (person, place, date, situation) answers questions appropriately follows commands  Speech: fluent, no aphasia or dysarthria    CRANIAL NERVES:  2,3: visual acuity grossly intact, PERRL  3,4,6: EOMI bilaterally, no nystagmus or diplopia  5: sensation intact to light touch bilaterally and symmetric  7: no facial asymmetry  8: hearing grossly intact  9,10: symmetric palate elevation  11: SCM/Trapezius strength 5/5 bilaterally  12: tongue protrudes midline    Motor:      Upper Extremity  Myotome R L   Shoulder flexion C5 5 5   Elbow flexion C5 5 5   Wrist extension C6 5 5   Elbow extension C7 5 5   Finger flexion C8 5 5   Finger abduction T1 5 5     Lower Extremity Myotome R L   Hip flexion L2 5 5   Knee extension L3 5 5   Ankle dorsiflexion L4 5 5   Toe extension L5 5 5   Ankle plantarflexion S1 5 5       DTRs: 2+ in bilateral biceps, triceps, brachioradialis, 2+ in bilateral patellar and achilles tendons  No clonus at bilateral ankles  Negative babinski b/l  Negative Johnson b/l     Tone: no spasticity noted, no cogwheeling noted    Labs: Reviewed and significant for   Recent Labs     05/27/20 0007 05/28/20  0004   RBC 4.42* 4.11*   HEMOGLOBIN 13.3* 12.3*   HEMATOCRIT 39.3* 37.2*   PLATELETCT 255 239     Recent Labs     05/27/20 0007 05/28/20  0004   SODIUM 135 133*   POTASSIUM 4.8 3.8 "   CHLORIDE 100 96   CO2 23 25   GLUCOSE 141* 112*   BUN 16 18   CREATININE 0.97 1.01   CALCIUM 8.8 8.8     No results found for this or any previous visit (from the past 24 hour(s)).    Imaging:   Dx-lumbar Spine-2 Or 3 Views    Result Date: 5/26/2020 5/26/2020 11:25 AM HISTORY/REASON FOR EXAM:  Main OR. Fusion TECHNIQUE/ EXAM DESCRIPTION AND NUMBER OF VIEWS:  2 views of the lumbar spine. COMPARISON: None. FINDINGS/    Intraoperative images intended for localization and not for diagnostic purposes demonstrate posterior rods, transpedicular screws and intervertebral disc space cage device at L5-S1. Laminectomy L5-S1. Surgical instrumentation at the L2-L3 level dorsally.. See procedure report for details. Fluoroscopy Time:  54 seconds.  3 fluoroscopic images were obtained.    Dx-portable Fluoro > 1 Hour    Result Date: 5/26/2020 5/26/2020 11:25 AM HISTORY/REASON FOR EXAM:  Main OR. Fusion TECHNIQUE/ EXAM DESCRIPTION AND NUMBER OF VIEWS:  2 views of the lumbar spine. COMPARISON: None. FINDINGS/    Intraoperative images intended for localization and not for diagnostic purposes demonstrate posterior rods, transpedicular screws and intervertebral disc space cage device at L5-S1. Laminectomy L5-S1. Surgical instrumentation at the L2-L3 level dorsally.. See procedure report for details. Fluoroscopy Time:  54 seconds.  3 fluoroscopic images were obtained.        ASSESSMENT:  Patient is a 54 y.o. male admitted with low back pain, status post L5-S1 TLIF and L2-3 discectomy     Rehabilitation: Impaired ADLs and mobility  Patient does not qualify for inpatient rehab based on lack of needs.  Patient is supervision with PT, and only requiring min assist with OT.  Furthermore he has good strength throughout.  I anticipate patient will be able to successfully discharged home with DME listed below:  - Front wheel walker  -Tub shower transfer bench  -Sock aid    Low back pain status post L5-S1 TLIF and L2-3 discectomy  -Postop  recovery going well  -Continue OT while in-house  -Anticipate patient will be able to discharge home with DME listed above  -Does not qualify for inpatient rehab    Pain  -Flexeril 10 mg 3 times daily  -Norco 5-325 every 4 hours as needed  -Tizanidine 4 mg every 8 hours    Tobacco abuse  -Tobacco cessation counseling given for roughly 5 minutes today    Bowel program  - Patient is constipated, likely related to immobility and opioid use, recommend the following:  - Senokot 1 tab nightly  - MiraLAX 1 packet twice daily PRN  - Milk of magnesia 30mL daily if no bowel movement in greater than 24 hours  - Dulcolax suppository 10 mg if patient goes more than 48 hours without a bowel movement  - Fleet enema if patient goes more than 72 hours without a bowel movement    DVT Prophylaxis:   -SCDs      Discussed with pt, summarized hospitalization and care, options for next step of care  Labs reviewed  Imaging personally reviewed   Discussed with team about recommendations     Thank you for allowing us to participate in the care of this patient.     Jace Jason, DO   Physical Medicine and Rehabilitation

## 2020-05-29 NOTE — DISCHARGE PLANNING
Received HH order, spoke to pt and got choice, faxed to [facility].  Pt now has SNF referral and PMR referral. Will defer to Physiatry for pt disposition

## 2020-05-29 NOTE — RESPIRATORY CARE
Oxygen Rounds      Patient found on    O2 L/m:  2  Oxygen device:  Nasal Cannula  Spo2: 94%      Respiratory device skin site inspection completed.

## 2020-05-29 NOTE — CARE PLAN
Problem: Venous Thromboembolism (VTW)/Deep Vein Thrombosis (DVT) Prevention:  Goal: Patient will participate in Venous Thrombosis (VTE)/Deep Vein Thrombosis (DVT)Prevention Measures  Outcome: PROGRESSING AS EXPECTED  Note: Patient ambulated, SCDs on when in bed     Problem: Bowel/Gastric:  Goal: Normal bowel function is maintained or improved  Outcome: PROGRESSING AS EXPECTED  Note: Patient stated having a bowel movement this afternoon

## 2020-05-29 NOTE — DISCHARGE PLANNING
[facility] Transitional Care Coordination     Referral from:  Domenic MCMAHON  Facesheet indicates:  [employer] - [employer]  Potential Rehab Diagnosis:  Other Ortho    Chart review indicates patient has on going medical management and therapy needs to possibly meet inpatient rehab facility criteria with the goal of returning to community.    D/C support:  Lives alone     Physiatry consultation forwarded per protocol.  TCC will follow.        Thank you for the referral.

## 2020-05-29 NOTE — PROGRESS NOTES
Neurosurgery Progress Note    Subjective:  C/o pain down left leg- worse than pre op,  LBP controlled, amb only briefly, voiding, eating, no bm yet, +flatus    Exam:  Strength 5/5  Inc c/d hvac out    BP  Min: 100/60  Max: 104/67  Pulse  Av.3  Min: 71  Max: 85  Resp  Av  Min: 16  Max: 16  Temp  Av.8 °C (98.3 °F)  Min: 36.6 °C (97.9 °F)  Max: 37.1 °C (98.8 °F)  SpO2  Av.7 %  Min: 92 %  Max: 98 %    No data recorded    Recent Labs     20  0007 20  0004   WBC 18.2* 16.1*   RBC 4.42* 4.11*   HEMOGLOBIN 13.3* 12.3*   HEMATOCRIT 39.3* 37.2*   MCV 88.5 90.5   MCH 29.9 29.9   MCHC 33.8 33.1*   RDW 38.0 39.4   PLATELETCT 255 239   MPV 10.4 10.5     Recent Labs     20  0007 20  0004   SODIUM 135 133*   POTASSIUM 4.8 3.8   CHLORIDE 100 96   CO2 23 25   GLUCOSE 141* 112*   BUN 16 18   CREATININE 0.97 1.01   CALCIUM 8.8 8.8               Intake/Output       20 - 20 0659 20 - 20 0659       Total  Total       Intake    P.O.  --  120 120  --  -- --    P.O. -- 120 120 -- -- --    Total Intake -- 120 120 -- -- --       Output    Urine  --  600 600  --  -- --    Number of Times Voided 2 x 2 x 4 x -- -- --    Urine Void (mL) -- 600 600 -- -- --    Drains  30  40 70  --  -- --    Output (mL) ([REMOVED] Closed/Suction Drain 1 Posterior Back Hemovac) 30 40 70 -- -- --    Total Output 30 640 670 -- -- --       Net I/O     -30 -520 -550 -- -- --            Intake/Output Summary (Last 24 hours) at 2020 0809  Last data filed at 2020 0400  Gross per 24 hour   Intake 120 ml   Output 670 ml   Net -550 ml            • magnesium citrate  296 mL Once   • dexamethasone  4 mg Q6HRS   • famotidine  20 mg BID   • tizanidine  4 mg Q8HRS   • cyclobenzaprine  10 mg TID PRN   • oxyCODONE immediate-release  5 mg Q4HRS PRN    Or   • oxyCODONE immediate-release  10 mg Q4HRS PRN   • diphenhydrAMINE  25-50 mg QHS PRN   • loratadine  10  mg DAILY   • therapeutic multivitamin-minerals  1 Tab DAILY   • Pharmacy Consult Request  1 Each PHARMACY TO DOSE   • MD ALERT...DO NOT ADMINISTER NSAIDS or ASPIRIN unless ORDERED By Neurosurgery  1 Each PRN   • docusate sodium  100 mg BID   • senna-docusate  1 Tab Nightly   • senna-docusate  1 Tab Q24HRS PRN   • polyethylene glycol/lytes  1 Packet BID PRN   • magnesium hydroxide  30 mL QDAY PRN   • bisacodyl  10 mg Q24HRS PRN   • fleet  1 Each Once PRN   • 0.9 % NaCl with KCl 20 mEq 1,000 mL   Continuous   • HYDROmorphone  0.5 mg Once PRN   • diphenhydrAMINE  25 mg Q6HRS PRN    Or   • diphenhydrAMINE  25 mg Q6HRS PRN   • ondansetron  4 mg Q4HRS PRN   • ondansetron  4 mg Q4HRS PRN   • promethazine  12.5-25 mg Q4HRS PRN   • promethazine  12.5-25 mg Q4HRS PRN   • prochlorperazine  5-10 mg Q4HRS PRN   • labetalol  10 mg Q HOUR PRN   • hydrALAZINE  10 mg Q HOUR PRN       Assessment and Plan:    POD# 3 L5-S1 TLIF, L2-3 disc  Chemical prophylactic DVT therapy: No  Start date/time: n/a    NM as above  Left leg pain- will begin decadron /pepcid  Pain control- on orals-- under care of Dr Guerra for pain management and has pain meds at home- has norco 5/325 and wants to change that here to ensure it works for hime  Bowel protocol---will give mag citrate  amb w/ LSO on when oob > 5 min/PT/OT  Lives alone, agreeable to rehab consult- will place referral for rehab and SNF, may not qualify so ordered home health as well, FWW ordered  Ok to discharge tomorrow if BM and pain better controlled down leg  Has pain meds at home  Will need MR's rx'd if discharges home over the weekend  Will prep for discharge to rehab in case he is accepted  Med rec and discharge summary completed  Will need discharge order

## 2020-05-29 NOTE — THERAPY
Physical Therapy   Daily Treatment     Patient Name: Devaughn Amador  Age:  54 y.o., Sex:  male  Medical Record #: [id number]  Today's Date: 5/29/2020     Precautions: Spinal / Back Precautions , Lumbosacral Orthosis    Subjective    Pt pleasant and agreeable to therapy.    Assessment    Pt progressing well w/ therapy. Pt was able to perform all mobility w/ improved ease today. He has the most difficulty w/ pain in his L LE. Pt initially w/ a step thru gait pattern but quickly fatigued and began to step to w/ increased reliance on UE's. Pt was stuck on stating he wouldn't be able to get OOB at home because it wasn't a hospital bed however he was able to perform bed mobility from a flat bed. Pt did demonstrate improved donning of his brace today w/ only verbal cues required for centering it. Pt asking a lot of questions about rehab vs home. He stated he wanted to think about what was safest for him.    Plan    Continue current treatment plan.    Discharge recommendations:  Recommend home health transitional care for continued physical therapy services. Recommend post-acute placement for continued physical therapy services prior to discharge home.          05/29/20 0811   Precautions   Precautions Spinal / Back Precautions ;Lumbosacral Orthosis   Comments LSO OOB   Gait Analysis   Gait Level Of Assist Minimal Assist   Assistive Device Front Wheel Walker   Distance (Feet) 200   # of Times Distance was Traveled 1   Deviation Antalgic;Step To;Bradykinetic;Shuffled Gait   Comments Pt initially able to perform step thru however w/ increased fatigue pt going to step to. Standing rest breaks during gait d/t pain.   Bed Mobility    Supine to Sit Supervised   Sit to Supine   (NT up in chair)   Scooting Supervised   Rolling Supervised   Comments Pt w/ improved ease of bed mobility. He did require heavy use of the bed rail from a flat bed.   Functional Mobility   Sit to Stand Supervised   Bed, Chair, Wheelchair Transfer  Supervised   Transfer Method Other (Comments)  (Ambulating)   Mobility With FWW   Short Term Goals    Short Term Goal # 1 pt will perform supine <> sit with HOB flat, no railing and log roll with SPV in 6 visits   Goal Outcome # 1 Progressing as expected   Short Term Goal # 2 pt will perform sit <> stand and functional transfers with LRAD and SPV to improve mobility independence in 6 visits   Goal Outcome # 2 Goal met   Short Term Goal # 3 pt will ambulate > 250 ft with LRAD and SPV to access community in 6 visits   Goal Outcome # 3 Progressing as expected   Short Term Goal # 4 pt will negotiate 1 curb step as needed to access home/community environments with LRAD and SPV in 6 visits   Goal Outcome # 4 Goal not met

## 2020-05-29 NOTE — THERAPY
Physical Therapy   Daily Treatment     Patient Name: Devaughn Amador  Age:  54 y.o., Sex:  male  Medical Record #: [id number]  Today's Date: 5/29/2020     Precautions: Spinal / Back Precautions , Lumbosacral Orthosis    Subjective    Pt pleasant and agreeable to therapy.    Assessment    Pt progressing well w/ therapy. He does require a lot of time and effort to perform all mobility. Pt able to recall spinal precautions but did need Trudy for donning brace w/ cues to remember to tighten it. Pt w/ increased L LE pain as he continued w/ mobility. Pt having to compensate by using his UE's more during gait. Pt lives alone but states he has help from his neighbor. He will benefit from HH upon DC.    Plan    Continue current treatment plan.    Discharge recommendations:  Recommend home health transitional care for continued physical therapy services.        05/28/20 1002   Precautions   Precautions Spinal / Back Precautions ;Lumbosacral Orthosis   Gait Analysis   Gait Level Of Assist Minimal Assist   Assistive Device Front Wheel Walker   Distance (Feet) 85   # of Times Distance was Traveled 1   Deviation Antalgic;Bradykinetic;Shuffled Gait   Comments Heavy reliance on UE's during L stance phase.   Bed Mobility    Supine to Sit Supervised   Sit to Supine   (NT sitting up in chair)   Scooting Supervised   Rolling Supervised   Comments Heavy use of bed rail and a lot of time to perform.   Functional Mobility   Sit to Stand Supervised   Bed, Chair, Wheelchair Transfer Supervised   Transfer Method Stand Pivot   Mobility SPT w/ FWW   Short Term Goals    Short Term Goal # 1 pt will perform supine <> sit with HOB flat, no railing and log roll with SPV in 6 visits   Goal Outcome # 1 Progressing as expected   Short Term Goal # 2 pt will perform sit <> stand and functional transfers with LRAD and SPV to improve mobility independence in 6 visits   Goal Outcome # 2 Progressing as expected   Short Term Goal # 3 pt will ambulate > 250  ft with LRAD and SPV to access community in 6 visits   Goal Outcome # 3 Progressing as expected   Short Term Goal # 4 pt will negotiate 1 curb step as needed to access home/community environments with LRAD and SPV in 6 visits   Goal Outcome # 4 Goal not met

## 2020-05-29 NOTE — FACE TO FACE
Face to Face Supporting Documentation - Home Health    The encounter with this patient was in whole or in part the primary reason for home health admission.    Date of encounter:   Patient:                    MRN:                       YOB: 2020  Devaughn Amador  [id number]  1965     Home health to see patient for:  Registered dietitian consult, Physical Therapy evaluation and treatment and Occupational therapy evaluation and treatment    Skilled need for:  Surgical Aftercare lumbar surgery    Skilled nursing interventions to include:  Comment: post op care    Homebound status evidenced by:  Need the aid of supportive devices such as crutches, canes, wheelchairs or walkers. Leaving home requires a considerable and taxing effort. There is a normal inability to leave the home.    Community Physician to provide follow up care: Pcp Pt States None     Optional Interventions? No      I certify the face to face encounter for this home health care referral meets the CMS requirements and the encounter/clinical assessment with the patient was, in whole, or in part, for the medical condition(s) listed above, which is the primary reason for home health care. Based on my clinical findings: the service(s) are medically necessary, support the need for home health care, and the homebound criteria are met.  I certify that this patient has had a face to face encounter by myself.  DESIRAE Walton. - NPI: [id number]

## 2020-05-30 ENCOUNTER — HOME HEALTH ADMISSION (OUTPATIENT)
Dept: HOME HEALTH SERVICES | Facility: HOME HEALTHCARE | Age: 55
End: 2020-05-30
Payer: COMMERCIAL

## 2020-05-30 PROCEDURE — 700111 HCHG RX REV CODE 636 W/ 250 OVERRIDE (IP): Performed by: CLINICAL NURSE SPECIALIST

## 2020-05-30 PROCEDURE — 700102 HCHG RX REV CODE 250 W/ 637 OVERRIDE(OP): Performed by: CLINICAL NURSE SPECIALIST

## 2020-05-30 PROCEDURE — 97116 GAIT TRAINING THERAPY: CPT | Mod: CQ

## 2020-05-30 PROCEDURE — A9270 NON-COVERED ITEM OR SERVICE: HCPCS | Performed by: NURSE PRACTITIONER

## 2020-05-30 PROCEDURE — A9270 NON-COVERED ITEM OR SERVICE: HCPCS | Performed by: CLINICAL NURSE SPECIALIST

## 2020-05-30 PROCEDURE — 97530 THERAPEUTIC ACTIVITIES: CPT | Mod: CQ

## 2020-05-30 PROCEDURE — 700102 HCHG RX REV CODE 250 W/ 637 OVERRIDE(OP): Performed by: NURSE PRACTITIONER

## 2020-05-30 PROCEDURE — 770006 HCHG ROOM/CARE - MED/SURG/GYN SEMI*

## 2020-05-30 RX ADMIN — TIZANIDINE 4 MG: 4 TABLET ORAL at 21:29

## 2020-05-30 RX ADMIN — HYDROCODONE BITARTRATE AND ACETAMINOPHEN 1 TABLET: 5; 325 TABLET ORAL at 04:16

## 2020-05-30 RX ADMIN — MULTIPLE VITAMINS W/ MINERALS TAB 1 TABLET: TAB at 04:16

## 2020-05-30 RX ADMIN — FAMOTIDINE 20 MG: 20 TABLET, FILM COATED ORAL at 17:49

## 2020-05-30 RX ADMIN — DEXAMETHASONE SODIUM PHOSPHATE 4 MG: 4 INJECTION, SOLUTION INTRA-ARTICULAR; INTRALESIONAL; INTRAMUSCULAR; INTRAVENOUS; SOFT TISSUE at 15:46

## 2020-05-30 RX ADMIN — HYDROCODONE BITARTRATE AND ACETAMINOPHEN 1 TABLET: 5; 325 TABLET ORAL at 21:29

## 2020-05-30 RX ADMIN — TIZANIDINE 4 MG: 4 TABLET ORAL at 13:25

## 2020-05-30 RX ADMIN — CYCLOBENZAPRINE HYDROCHLORIDE 10 MG: 10 TABLET, FILM COATED ORAL at 23:08

## 2020-05-30 RX ADMIN — DEXAMETHASONE SODIUM PHOSPHATE 4 MG: 4 INJECTION, SOLUTION INTRA-ARTICULAR; INTRALESIONAL; INTRAMUSCULAR; INTRAVENOUS; SOFT TISSUE at 04:16

## 2020-05-30 RX ADMIN — TIZANIDINE 4 MG: 4 TABLET ORAL at 04:16

## 2020-05-30 RX ADMIN — FAMOTIDINE 20 MG: 20 TABLET, FILM COATED ORAL at 04:16

## 2020-05-30 RX ADMIN — DEXAMETHASONE SODIUM PHOSPHATE 4 MG: 4 INJECTION, SOLUTION INTRA-ARTICULAR; INTRALESIONAL; INTRAMUSCULAR; INTRAVENOUS; SOFT TISSUE at 09:07

## 2020-05-30 RX ADMIN — LORATADINE 10 MG: 10 TABLET ORAL at 04:16

## 2020-05-30 ASSESSMENT — COGNITIVE AND FUNCTIONAL STATUS - GENERAL
SUGGESTED CMS G CODE MODIFIER MOBILITY: CJ
MOBILITY SCORE: 20
MOVING FROM LYING ON BACK TO SITTING ON SIDE OF FLAT BED: A LITTLE
STANDING UP FROM CHAIR USING ARMS: A LITTLE
WALKING IN HOSPITAL ROOM: A LITTLE
CLIMB 3 TO 5 STEPS WITH RAILING: A LITTLE

## 2020-05-30 ASSESSMENT — GAIT ASSESSMENTS
DISTANCE (FEET): 400
DEVIATION: BRADYKINETIC
GAIT LEVEL OF ASSIST: SUPERVISED

## 2020-05-30 NOTE — DISCHARGE PLANNING
LSW was informed by BS RN that pt is ready to dc and needs HH.  LSW faxed HH choice to Marti TURCIOS.  Referral to be sent to [facility] which is only option for acceptance on the weekend.    Pt has DME orders, referral to be sent to [facility].

## 2020-05-30 NOTE — DISCHARGE PLANNING
Thank you for sending this referral to [facility].     It appears patient does not have a PCP. Please provide PCP information. [facility] will need a valid address patient will be d/c too. Also, we will be unable to verify insurance until Monday. Referral will be placed on hold until then.     Thanks,   [facility]

## 2020-05-30 NOTE — FACE TO FACE
Face to Face Note  -  Durable Medical Equipment    Kerry Bolaños P.A.-C. - NPI: [id number]  I certify that this patient is under my care and that they had a durable medical equipment(DME)face to face encounter by myself that meets the physician DME face-to-face encounter requirements with this patient on:    Date of encounter:   Patient:                    MRN:                       YOB: 2020  Devaughn Amador  [id number]  1965     The encounter with the patient was in whole, or in part, for the following medical condition, which is the primary reason for durable medical equipment:  Post-Op Surgery    I certify that, based on my findings, the following durable medical equipment is medically necessary:  Walkers.  Sock aid  Tub transfer bench    HOME O2 Saturation Measurements:(Values must be present for Home Oxygen orders)         ,     ,         My Clinical findings support the need for the above equipment due to:  Abnormal Gait    Supporting Symptoms: per therapy recs

## 2020-05-30 NOTE — DISCHARGE PLANNING
[facility] Transitional [facility] Coordination     Physiatry consult complete.  Dr. Jason recommending -    Patient does not qualify for inpatient rehab based on lack of needs.  Patient is supervision with PT, and only requiring min assist with OT.  Furthermore he has good strength throughout.  I anticipate patient will be able to successfully discharged home with DME listed below:  - Front wheel walker  -Tub shower transfer bench  -Sock aid

## 2020-05-30 NOTE — DISCHARGE PLANNING
"Agency/Facility Name: [facility]  Spoke To: Rachel  Outcome: Unable to verify patient insurance until Monday. Also unable to provide \"sock aid\".     "

## 2020-05-30 NOTE — PROGRESS NOTES
Neurosurgery Progress Note    Subjective:  Pain controlled, moderate back and left leg pain with mobilization  Pt agreeable to discharge home with HH and DME today  +BM    Exam:  A&O  Strength 5/5 BLE, sensation grossly intact  Dressing CDI    BP  Min: 100/67  Max: 130/80  Pulse  Av.8  Min: 68  Max: 78  Resp  Av.5  Min: 16  Max: 18  Temp  Av.7 °C (98.1 °F)  Min: 36.2 °C (97.1 °F)  Max: 37 °C (98.6 °F)  SpO2  Av.5 %  Min: 93 %  Max: 96 %    No data recorded    Recent Labs     20  0004   WBC 16.1*   RBC 4.11*   HEMOGLOBIN 12.3*   HEMATOCRIT 37.2*   MCV 90.5   MCH 29.9   MCHC 33.1*   RDW 39.4   PLATELETCT 239   MPV 10.5     Recent Labs     20  0004   SODIUM 133*   POTASSIUM 3.8   CHLORIDE 96   CO2 25   GLUCOSE 112*   BUN 18   CREATININE 1.01   CALCIUM 8.8               Intake/Output       20 07 - 20 0659 20 07 - 20 0659      [id number] [id number] Total [id number] 6159-3258 Total       Intake    P.O.  480  240 720  --  -- --    P.O. 480 240 720 -- -- --    Total Intake 480 240 720 -- -- --       Output    Urine  --  -- --  --  -- --    Number of Times Voided -- 1 x 1 x -- -- --    Stool  --  -- --  --  -- --    Number of Times Stooled 1 x -- 1 x -- -- --    Total Output -- -- -- -- -- --       Net I/O     480 240 720 -- -- --            Intake/Output Summary (Last 24 hours) at 2020 1219  Last data filed at 2020  Gross per 24 hour   Intake 480 ml   Output --   Net 480 ml            • dexamethasone  4 mg Q6HRS   • famotidine  20 mg BID   • HYDROcodone-acetaminophen  1 Tab Q4HRS PRN   • tizanidine  4 mg Q8HRS   • cyclobenzaprine  10 mg TID PRN   • diphenhydrAMINE  25-50 mg QHS PRN   • loratadine  10 mg DAILY   • therapeutic multivitamin-minerals  1 Tab DAILY   • Pharmacy Consult Request  1 Each PHARMACY TO DOSE   • MD ALERT...DO NOT ADMINISTER NSAIDS or ASPIRIN unless ORDERED By Neurosurgery  1 Each PRN   • docusate sodium  100 mg BID   •  senna-docusate  1 Tab Nightly   • senna-docusate  1 Tab Q24HRS PRN   • polyethylene glycol/lytes  1 Packet BID PRN   • magnesium hydroxide  30 mL QDAY PRN   • bisacodyl  10 mg Q24HRS PRN   • fleet  1 Each Once PRN   • 0.9 % NaCl with KCl 20 mEq 1,000 mL   Continuous   • HYDROmorphone  0.5 mg Once PRN   • diphenhydrAMINE  25 mg Q6HRS PRN    Or   • diphenhydrAMINE  25 mg Q6HRS PRN   • ondansetron  4 mg Q4HRS PRN   • ondansetron  4 mg Q4HRS PRN   • promethazine  12.5-25 mg Q4HRS PRN   • promethazine  12.5-25 mg Q4HRS PRN   • prochlorperazine  5-10 mg Q4HRS PRN   • labetalol  10 mg Q HOUR PRN   • hydrALAZINE  10 mg Q HOUR PRN       Assessment and Plan:    POD# 4 L5-S1 TLIF, L2-3 disc  Chemical prophylactic DVT therapy: No  Start date/time: n/a    NM as above  Not a rehab candidate, recommend DC home with DME  Pain control- on orals-- under care of Dr Guerra for pain management and has pain meds at home- has norco 5/325 & tizanidine, verified with pt  amb w/ LSO on when oob > 5 min/PT/OT    Med rec and discharge summary completed  Home today

## 2020-05-30 NOTE — DISCHARGE PLANNING
Received Choice form at 1459  Agency/Facility Name: [facility]   Referral sent per Choice form @ 0462     Received Choice form at 4628  Agency/Facility Name: [facility]  Referral sent per Choice form @ 1748

## 2020-05-30 NOTE — THERAPY
"Physical Therapy   Daily Treatment     Patient Name: Devaughn Amador  Age:  54 y.o., Sex:  male  Medical Record #: [id number]  Today's Date: 5/30/2020     Precautions: (P) Spinal / Back Precautions , Lumbosacral Orthosis    Subjective  \" Listen I am probably not going to follow those precautions, I do what is best for me\"  \"I have to get dressed before we go out there, otherwise security will arrest me for not having pants on\"    Assessment    Pt upon arrival was up self in room ambulating around without brace donned and no AD. Pt adamant about putting pants on before donning brace. He was able to don brace standing with no assist at this time. When proceeded to gait training he requested holding fww folded up in one hand but than essentially ended up carrying it down the hallway. He was able to increase total gait distance with spv and no LOB or AD. He required one standing rest break due to being hyper verbose. Provided education on spinal precautions, brace wear and energy conservation. Pt not receptive and reported he will do what is best for him. When returning to back to bed he performed with spv but did not follow log rolling technique. PT mainly seems to be limited by cognition at this time.     Plan    Continue current treatment plan.    Discharge recommendations:  Recommend home health transitional care for continued physical therapy services.        05/30/20 0901   Gait Analysis   Gait Level Of Assist Supervised   Assistive Device None   Distance (Feet) 400   # of Times Distance was Traveled 1   Deviation Bradykinetic   # of Stairs Climbed   (pt deferred )   Skilled Intervention Verbal Cuing   Comments Upon entrance pt up in room by self ambulating with no AD. Pt proceed to state he needed to hold walker but folded up in his right hand. HE proceeded to essientially carry the walker during gait. He presents with slow nilsa, but no lob at this time    Bed Mobility    Supine to Sit Supervised   Sit to " Supine Supervised   Scooting Supervised   Rolling Supervised   Comments no bed features. Educated on log rolling technique    Functional Mobility   Sit to Stand Supervised   Bed, Chair, Wheelchair Transfer Supervised   Skilled Intervention Verbal Cuing   Short Term Goals    Short Term Goal # 1 pt will perform supine <> sit with HOB flat, no railing and log roll with SPV in 6 visits   Goal Outcome # 1 Progressing as expected   Short Term Goal # 2 pt will perform sit <> stand and functional transfers with LRAD and SPV to improve mobility independence in 6 visits   Goal Outcome # 2 Goal met   Short Term Goal # 3 pt will ambulate > 250 ft with LRAD and SPV to access community in 6 visits   Goal Outcome # 3 Goal met   Short Term Goal # 4 pt will negotiate 1 curb step as needed to access home/community environments with LRAD and SPV in 6 visits   Goal Outcome # 4 Goal not met

## 2020-05-30 NOTE — DISCHARGE SUMMARY
DATE OF ADMISSION:  05/26/2020    DATE OF DISCHARGE:  05/30/2020    ADMITTING DIAGNOSES:  Left L2-L3 herniated disk with motor and sensory   radiculopathy recalcitrant to nonoperative measures, L5-S1 spondylolisthesis   with recalcitrant back pain and radiculopathy.    HISTORY OF PRESENT ILLNESS:  Please refer to the previously dictated history   and physical for complete details.  The patient is a 54-year-old patient of   Dr. Oscar who had the above findings.  Dr. Oscar discussed surgery and the   risks and benefits and the patient wished to proceed.    COURSE OF HOSPITALIZATION:  The patient was admitted to [facility] on 05/26/2020   and on that date, he was brought to the operating room where he underwent a   left L2-L3 microdiskectomy and via separate incision, he underwent an L5-S1   transforaminal lumbar interbody fusion with Dr. Oscar.    Postoperatively, he has reported pain down his left leg that was initially   described as a cramp, but on postoperative day #3, he reports that it feels   more like a nerve pain.  We will start Decadron and Pepcid.  His strength is   5/5.  He is ambulatory with a front-wheel walker.  He is voiding, eating,   passing flatus.  We will give him magnesium citrate today.  His incision is   clean, dry, flat and his Hemovac has been removed.  We have placed referral to   rehab and skilled nursing as well as home health referral has been put in.    We will determine tomorrow if he is a candidate for either rehab or skilled   nursing facility or if he will go home with home health.    DISCHARGE INSTRUCTIONS:  1. Follow up at [facility] 2 weeks postoperatively.  2. No lifting greater than 10 pounds, no repetitive bending or twisting.  3. LSO brace on when out of bed greater than 5 minutes, okay off for shower   and bathroom.  4. No nonsteroidal antiinflammatory medications or aspirin.    DISCHARGE MEDICATIONS:  1. Norco 5/325 one every 4 hours p.r.n. pain (the patient  has the prescription   at home through his pain management provider).  2. Loratadine 10 mg daily as needed for allergies.  3. Multivitamin daily.  4. Tizanidine 4 mg every 6 hours p.r.n. spasm.  5. Benadryl 1-2 at bedtime as needed for sleep.  6. Dulcolax suppository daily p.r.n. no bowel movement.  7. Flexeril 10 mg t.i.d. p.r.n. spasm.  8. Docusate sodium 100 mg p.o. b.i.d.  9. Fleet enema p.r.n. no bowel movement.    IMPRESSION AND PLAN:  1. Admitting diagnosis:  Left L2-L3 herniated disk and L5-S1 spondylolisthesis   with motor and sensory radiculopathy and recalcitrant back pain.  Operation   Performed:  Left L2-L3 microdiskectomy and via separate incision L5-S1 TLIF   with Dr. Oscar on 05/26/2020.  2. Leg pain.  We will start him on Decadron.  3. The patient will be discharged to either rehab, skilled nursing facility,   or home with home health once his pain is under better control and he has had   a bowel movement with the instructions and medications as outlined above.             ____________________________________     LISANDRO MARTINEZ / JOHN    DD:  05/29/2020 08:35:38  DT:  05/29/2020 19:41:14    D#:  [id number]  Job#:  [id number]

## 2020-05-30 NOTE — CARE PLAN
Problem: Communication  Goal: The ability to communicate needs accurately and effectively will improve  Outcome: PROGRESSING AS EXPECTED     Problem: Safety  Goal: Will remain free from falls  Outcome: PROGRESSING AS EXPECTED     Problem: Infection  Goal: Will remain free from infection  Outcome: PROGRESSING AS EXPECTED     Problem: Pain Management  Goal: Pain level will decrease to patient's comfort goal  Outcome: PROGRESSING AS EXPECTED

## 2020-05-31 PROCEDURE — 770006 HCHG ROOM/CARE - MED/SURG/GYN SEMI*

## 2020-05-31 PROCEDURE — 700102 HCHG RX REV CODE 250 W/ 637 OVERRIDE(OP): Performed by: CLINICAL NURSE SPECIALIST

## 2020-05-31 PROCEDURE — A9270 NON-COVERED ITEM OR SERVICE: HCPCS | Performed by: NURSE PRACTITIONER

## 2020-05-31 PROCEDURE — 700102 HCHG RX REV CODE 250 W/ 637 OVERRIDE(OP): Performed by: NURSE PRACTITIONER

## 2020-05-31 PROCEDURE — A9270 NON-COVERED ITEM OR SERVICE: HCPCS | Performed by: CLINICAL NURSE SPECIALIST

## 2020-05-31 RX ADMIN — LORATADINE 10 MG: 10 TABLET ORAL at 05:14

## 2020-05-31 RX ADMIN — MULTIPLE VITAMINS W/ MINERALS TAB 1 TABLET: TAB at 05:14

## 2020-05-31 RX ADMIN — TIZANIDINE 4 MG: 4 TABLET ORAL at 20:16

## 2020-05-31 RX ADMIN — FAMOTIDINE 20 MG: 20 TABLET, FILM COATED ORAL at 05:14

## 2020-05-31 RX ADMIN — FAMOTIDINE 20 MG: 20 TABLET, FILM COATED ORAL at 17:03

## 2020-05-31 RX ADMIN — HYDROCODONE BITARTRATE AND ACETAMINOPHEN 1 TABLET: 5; 325 TABLET ORAL at 11:02

## 2020-05-31 RX ADMIN — TIZANIDINE 4 MG: 4 TABLET ORAL at 05:14

## 2020-05-31 RX ADMIN — TIZANIDINE 4 MG: 4 TABLET ORAL at 13:37

## 2020-05-31 RX ADMIN — HYDROCODONE BITARTRATE AND ACETAMINOPHEN 1 TABLET: 5; 325 TABLET ORAL at 05:14

## 2020-05-31 RX ADMIN — HYDROCODONE BITARTRATE AND ACETAMINOPHEN 1 TABLET: 5; 325 TABLET ORAL at 20:16

## 2020-05-31 NOTE — PROGRESS NOTES
Received report, assumed pt care. Pt a&o 4, VSS, assessment completed. Resting comfortably in bed with call light, bedside table in reach. No c/o at this time. Side rails up 2. Instructed to call RN at ext: [phone] when needing assistance verbalized understanding. Bed in low position. Will continue to monitor.

## 2020-05-31 NOTE — CARE PLAN
Problem: Psychosocial Needs:  Goal: Level of anxiety will decrease  Outcome: PROGRESSING AS EXPECTED  Note: Pt anxious to go home, pt requesting a shower after breakfast, pt will be provided new linen, gown, socks and shower supplies.      Problem: Discharge Barriers/Planning  Goal: Patient's continuum of care needs will be met  Outcome: PROGRESSING SLOWER THAN EXPECTED  Note: Pt to DC home with [facility] pending: insurance auth, PCP appointment and valid DC address. Pt to dc with DME pending insurance auth (DME company [facility] not able to supply a sock aid). CM to follow up on Monday. Will encourage pt to ambulate in the  hallway.

## 2020-05-31 NOTE — CARE PLAN
Problem: Pain Management  Goal: Pain level will decrease to patient's comfort goal  Outcome: PROGRESSING AS EXPECTED  Pt pain is managed at his comfort level. PRN pain medications given.     Problem: Knowledge Deficit  Goal: Knowledge of the prescribed therapeutic regimen will improve  Outcome: PROGRESSING SLOWER THAN EXPECTED  Encouraged Pt to deep breathe and use his IS. Pt is ambulating in the halls with LSO brace on with staff.

## 2020-05-31 NOTE — PROGRESS NOTES
Neurosurgery Progress Note    Subjective:  Requests hospital bed and handicap sticker for car.  HH as well as DME not able to be arranged until Monday per notes and nursing      Exam:  A&O  Strength 5/5 BLE, sensation grossly intact  Dressing CDI    BP  Min: 101/68  Max: 115/73  Pulse  Av.3  Min: 63  Max: 77  Resp  Av.5  Min: 16  Max: 17  Temp  Av.4 °C (97.5 °F)  Min: 36.1 °C (97 °F)  Max: 36.6 °C (97.8 °F)  SpO2  Av %  Min: 90 %  Max: 96 %    No data recorded                      Intake/Output       20 - 20 0659 20 - 20 0659       Total  Total       Intake    P.O.  --  -- --  360  -- 360    P.O. -- -- -- 360 -- 360    Total Intake -- -- -- 360 -- 360       Output    Urine  --  -- --  --  -- --    Number of Times Voided -- 3 x 3 x -- -- --    Stool  --  -- --  --  -- --    Number of Times Stooled -- 2 x 2 x -- -- --    Total Output -- -- -- -- -- --       Net I/O     -- -- -- 360 -- 360            Intake/Output Summary (Last 24 hours) at 2020 1321  Last data filed at 2020 1000  Gross per 24 hour   Intake 360 ml   Output --   Net 360 ml            • famotidine  20 mg BID   • HYDROcodone-acetaminophen  1 Tab Q4HRS PRN   • tizanidine  4 mg Q8HRS   • cyclobenzaprine  10 mg TID PRN   • diphenhydrAMINE  25-50 mg QHS PRN   • loratadine  10 mg DAILY   • therapeutic multivitamin-minerals  1 Tab DAILY   • Pharmacy Consult Request  1 Each PHARMACY TO DOSE   • MD ALERT...DO NOT ADMINISTER NSAIDS or ASPIRIN unless ORDERED By Neurosurgery  1 Each PRN   • docusate sodium  100 mg BID   • senna-docusate  1 Tab Nightly   • senna-docusate  1 Tab Q24HRS PRN   • polyethylene glycol/lytes  1 Packet BID PRN   • magnesium hydroxide  30 mL QDAY PRN   • bisacodyl  10 mg Q24HRS PRN   • fleet  1 Each Once PRN   • 0.9 % NaCl with KCl 20 mEq 1,000 mL   Continuous   • HYDROmorphone  0.5 mg Once PRN   • diphenhydrAMINE  25 mg Q6HRS PRN    Or   •  diphenhydrAMINE  25 mg Q6HRS PRN   • ondansetron  4 mg Q4HRS PRN   • ondansetron  4 mg Q4HRS PRN   • promethazine  12.5-25 mg Q4HRS PRN   • promethazine  12.5-25 mg Q4HRS PRN   • prochlorperazine  5-10 mg Q4HRS PRN   • labetalol  10 mg Q HOUR PRN   • hydrALAZINE  10 mg Q HOUR PRN       Assessment and Plan:    POD# 5 L5-S1 TLIF, L2-3 disc  Chemical prophylactic DVT therapy: No  Start date/time: n/a      Awaiting HH and DME arrangements- insurance verfication  Pain control- on orals-- under care of Dr Guerra for pain management and has pain meds at home- has norco 5/325 & tizanidine  amb w/ LSO on when oob > 5 min/PT/OT    Updated patient that certain DME such as hospital beds are not covered by insurance. Handicap sticker to be discussed with primary team; however did discuss that ambulation is most times preferred. Home when HH and DME arrranged

## 2020-06-01 VITALS
TEMPERATURE: 98.1 F | OXYGEN SATURATION: 95 % | SYSTOLIC BLOOD PRESSURE: 122 MMHG | BODY MASS INDEX: 29.07 KG/M2 | HEIGHT: 70 IN | HEART RATE: 72 BPM | RESPIRATION RATE: 18 BRPM | DIASTOLIC BLOOD PRESSURE: 81 MMHG | WEIGHT: 203.04 LBS

## 2020-06-01 PROCEDURE — 700112 HCHG RX REV CODE 229: Performed by: NURSE PRACTITIONER

## 2020-06-01 PROCEDURE — A9270 NON-COVERED ITEM OR SERVICE: HCPCS | Performed by: NURSE PRACTITIONER

## 2020-06-01 PROCEDURE — 97535 SELF CARE MNGMENT TRAINING: CPT

## 2020-06-01 PROCEDURE — 700102 HCHG RX REV CODE 250 W/ 637 OVERRIDE(OP): Performed by: CLINICAL NURSE SPECIALIST

## 2020-06-01 PROCEDURE — 700102 HCHG RX REV CODE 250 W/ 637 OVERRIDE(OP): Performed by: NURSE PRACTITIONER

## 2020-06-01 PROCEDURE — A9270 NON-COVERED ITEM OR SERVICE: HCPCS | Performed by: CLINICAL NURSE SPECIALIST

## 2020-06-01 RX ADMIN — TIZANIDINE 4 MG: 4 TABLET ORAL at 04:28

## 2020-06-01 RX ADMIN — HYDROCODONE BITARTRATE AND ACETAMINOPHEN 1 TABLET: 5; 325 TABLET ORAL at 09:14

## 2020-06-01 RX ADMIN — HYDROCODONE BITARTRATE AND ACETAMINOPHEN 1 TABLET: 5; 325 TABLET ORAL at 04:27

## 2020-06-01 RX ADMIN — MULTIPLE VITAMINS W/ MINERALS TAB 1 TABLET: TAB at 04:28

## 2020-06-01 RX ADMIN — FAMOTIDINE 20 MG: 20 TABLET, FILM COATED ORAL at 04:27

## 2020-06-01 RX ADMIN — DOCUSATE SODIUM 100 MG: 100 CAPSULE, LIQUID FILLED ORAL at 04:27

## 2020-06-01 RX ADMIN — LORATADINE 10 MG: 10 TABLET ORAL at 04:28

## 2020-06-01 ASSESSMENT — COGNITIVE AND FUNCTIONAL STATUS - GENERAL
HELP NEEDED FOR BATHING: A LOT
DAILY ACTIVITIY SCORE: 19
SUGGESTED CMS G CODE MODIFIER DAILY ACTIVITY: CK
DRESSING REGULAR LOWER BODY CLOTHING: A LOT
DRESSING REGULAR UPPER BODY CLOTHING: A LITTLE

## 2020-06-01 ASSESSMENT — PAIN SCALES - GENERAL: PAIN_LEVEL: 3

## 2020-06-01 NOTE — ANESTHESIA POSTPROCEDURE EVALUATION
Patient: Devaughn Amador    Procedure Summary     Date:  05/26/20 Room / Location:  [facility] 05 / SURGERY [facility]    Anesthesia Start:  1125 Anesthesia Stop:  1437    Procedures:       FUSION, SPINE, LUMBAR, PLIF- L5-S1 TLIF (Spine Lumbar)      LAMINECTOMY, SPINE, LUMBAR, WITH DISCECTOMY- L2-3 MICRODISC (Spine Lumbar) Diagnosis:  (DEGENERATIVE SPONDYLOLISTHESIS)    Surgeon:  Dutch Oscar M.D. Responsible Provider:  Mickey Melo M.D.    Anesthesia Type:  general ASA Status:  2          Final Anesthesia Type: general  Last vitals  BP   Blood Pressure: 122/81    Temp   36.7 °C (98.1 °F)    Pulse   Pulse: 72   Resp   18    SpO2   95 %      Anesthesia Post Evaluation    Patient location during evaluation: PACU  Patient participation: complete - patient participated  Level of consciousness: awake and alert  Pain score: 3    Airway patency: patent  Anesthetic complications: no  Cardiovascular status: hemodynamically stable  Respiratory status: acceptable  Hydration status: euvolemic    PONV: none           Nurse Pain Score: 3 (NPRS)

## 2020-06-01 NOTE — DISCHARGE PLANNING
Spoke to Diya hubbard  about HH and no PCP. Diya states we must use [facility]  for HH and directs that surgeon follow for HH. Discussed with APNSyeda, she is aware and agrees with HH contacting their office for concerns. Syeda is aware that it will take some time for HH to be arranged by [facility]. She is wants pt to discharged prior to confirmation of HH.

## 2020-06-01 NOTE — CARE PLAN
Problem: Safety  Goal: Will remain free from injury  Outcome: PROGRESSING AS EXPECTED  Note: Educated pt to use call button to call for help before getting up. Bed rails up x2. Provided hospital non-slip socks. Bed locked and at the lowest position. Call light and personal belongings within reach.     Problem: Pain Management  Goal: Pain level will decrease to patient's comfort goal  Outcome: PROGRESSING AS EXPECTED  Note:   Discussed pt's desired comfort goal. Educated on pharm/non-pharm measures of preventing pain. Verbalized understanding. Pt has been within comfort goal.

## 2020-06-01 NOTE — PROGRESS NOTES
Discharge document and RX was Provided and explained to pt. Verbalized understanding. Iv removed, tip intact. Pt discharged home with all personal belongings.Syeda. LISANDRO jama to send patient home while UMAIR is setting up Home health.

## 2020-06-01 NOTE — PROGRESS NOTES
Pt alert and oriented. Reported 6/10 pain. PRN pain medication administered per MAR. Assisted pt with repositioning using pillows. Surgical site clean and intact. Reported no further needs. Bed locked and at the lowest position. Call button and and personal belongings placed within reach. No family member at bedside.

## 2020-06-01 NOTE — DISCHARGE INSTRUCTIONS
Discharge Instructions    Discharged to home by car with friend. Discharged via wheelchair, hospital escort: Yes.  Special equipment needed: TLSO    Be sure to schedule a follow-up appointment with your primary care doctor or any specialists as instructed.     Discharge Plan:     Ok to shower, pat incision dry, leave open to air- no dressing   No Aspirin or non-steroidal anti-inflammatory medications (aleve, motrin, ibuprofen, celebrex)  No driving for 2 weeks/ No driving while on narcotic medication  Over the counter stool softeners daily while on narcotics  No lifting greater than 10 pounds, no repetitive bending or twisting  Follow up at [facility] 2 weeks after surgery      Diet Plan: (P) Discussed  Activity Level: (P) Discussed  Smoking Cessation Offered: Patient Refused  Confirmed Follow up Appointment: (P) Patient to Call and Schedule Appointment  Confirmed Symptoms Management: (P) Discussed  Medication Reconciliation Updated: (P) Yes    I understand that a diet low in cholesterol, fat, and sodium is recommended for good health. Unless I have been given specific instructions below for another diet, I accept this instruction as my diet prescription.   Other diet:     Special Instructions:   Spinal Fusion  Spinal fusion is a procedure to make two or more of the bones in your spinal column (vertebrae) grow together (fuse). This procedure stops the vertebrae from moving and rubbing against each other. The goal of this procedure is to relieve pain and prevent deformity and weakening of the spine.  During a spinal fusion procedure, bone material (graft) is put in between the two vertebrae to help them fuse. Hardware such as rods, screws, metal plates, or cages can be inserted to stabilize the vertebrae while they heal.  This procedure is used to treat conditions, including:   · Spinal injury.  · Herniated disk.  · Abnormal curvatures of the spine, such as scoliosis or kyphosis.  · Infections or tumors in  the spine.  · Spondylolisthesis. This is when one vertebra slips on top of another.  · Spinal stenosis. This is a narrowing of the spine.  LET YOUR HEALTH CARE PROVIDER KNOW ABOUT:  · Any allergies you have.  · All medicines you are taking, including vitamins, herbs, eye drops, creams, and over-the-counter medicines.  · Previous problems you or members of your family have had with the use of anesthetics.  · Any blood disorders you have.  · Previous surgeries you have had.  · Any medical conditions you have.  · Whether you are pregnant or may be pregnant.  RISKS AND COMPLICATIONS  Generally, this is a safe procedure. However, problems may occur, including:  · Infection.  · Bleeding.  · Allergic reactions to medicines or dyes.  · Damage to other structures or organs, such as nerves near the spine.  · Spinal fluid leakage.  · Blood clots.  · Difficulty controlling urination or bowel movements.  · Pseudoarthrosis. This is when the vertebrae do not fuse together completely.  BEFORE THE PROCEDURE  · Ask your health care provider about:  ¨ Changing or stopping your regular medicines. This is especially important if you are taking diabetes medicines or blood thinners.  ¨ Taking medicines such as aspirin and ibuprofen. These medicines can thin your blood. Do not take these medicines before your procedure if your health care provider instructs you not to.  · Ask your health care provider how your surgical site will be marked or identified.  · You may be given antibiotic medicine to help prevent infection.  · Your health care provider will do a physical exam.  · You will have blood and urine samples taken.  · You may also have tests, such as:  ¨ X-rays.  ¨ CT scan.  ¨ MRI.  · Follow instructions from your health care provider about eating or drinking restrictions.  · Plan to have someone take you home after the procedure.  · If you go home right after the procedure, plan to have someone with you for 24 hours.  PROCEDURE  · To  reduce your risk of infection:  ¨ Your health care team will wash or sanitize their hands.  ¨ Your skin will be washed with soap.  · An IV tube will be inserted into one of your veins.  · You will be given one or more of the following:  ¨ A medicine to help you relax (sedative).  ¨ A medicine to make you fall asleep (general anesthetic).  · If bone from another part of your body (autogenous bone) is being used to fill the space between your vertebrae:  ¨ Often, the bone is taken from the hip (pelvic) bone.  ¨ An incision will be made over the site of the bone graft.  ¨ A small part of the bone will be removed.  · An incision will be made over the vertebrae that will be fused. This incision may be on your back, abdomen, or side.  · The back muscles will be moved aside so the surgeon can see the vertebrae.  · If you are having this procedure to treat a herniated disk, part of the disk will be removed.  · The space between the vertebrae will be filled with autogenous bone, bone from a bone donor (allograft bone), or artificial bone material.  · The back muscles will be moved back into place.  · Screws and rods or metal plates may be used to stabilize the vertebrae while they fuse.  · Your incision(s) may be closed.  · A bandage (dressing) may be used to cover your incision(s).  The procedure may vary among health care providers and hospitals.   AFTER THE PROCEDURE  · You will be given medicine as needed for pain.  · You will continue to receive fluids and medicines through an IV tube.  · Your blood pressure, heart rate, breathing rate, and blood oxygen level will be monitored often until the medicines you were given have worn off.    · Do not drive for 24 hours if you received a sedative.  · You may have to wear compression stockings. These stockings help to prevent blood clots and reduce swelling in your legs.  · You will be taught how to move correctly and how to stand and walk. While in bed, you will be instructed  "to turn frequently using a \"log rolling\" technique, in which the entire body is moved without twisting the back.  This information is not intended to replace advice given to you by your health care provider. Make sure you discuss any questions you have with your health care provider.  Document Released: 09/15/2004 Document Revised: 04/10/2017 Document Reviewed: 06/01/2016  [employer] Interactive Patient Education © 2017 [employer]      · Is patient discharged on Warfarin / Coumadin?   No     Depression / Suicide Risk    As you are discharged from this [facility] facility, it is important to learn how to keep safe from harming yourself.    Recognize the warning signs:  · Abrupt changes in personality, positive or negative- including increase in energy   · Giving away possessions  · Change in eating patterns- significant weight changes-  positive or negative  · Change in sleeping patterns- unable to sleep or sleeping all the time   · Unwillingness or inability to communicate  · Depression  · Unusual sadness, discouragement and loneliness  · Talk of wanting to die  · Neglect of personal appearance   · Rebelliousness- reckless behavior  · Withdrawal from people/activities they love  · Confusion- inability to concentrate     If you or a loved one observes any of these behaviors or has concerns about self-harm, here's what you can do:  · Talk about it- your feelings and reasons for harming yourself  · Remove any means that you might use to hurt yourself (examples: pills, rope, extension cords, firearm)  · Get professional help from the community (Mental Health, Substance Abuse, psychological counseling)  · Do not be alone:Call your Safe Contact- someone whom you trust who will be there for you.  · Call your local CRISIS HOTLINE 886-8540 or 506-603-7454  · Call your local Children's Mobile Crisis Response Team [place] (155) 457-1512 or www.Tecnoblu  · Call the toll free National Suicide Prevention " Hotlines   · National Suicide Prevention Lifeline 461-368-GTXB (7497)  · [facility] 800-SUICIDE (561-1849)    Bisacodyl suppositories  What is this medicine?  BISACODYL (bis a KOE dill) is a laxative. This medicine is used to relieve constipation. It may also be used to empty and prepare the bowel for surgery or examination.  This medicine may be used for other purposes; ask your health care provider or pharmacist if you have questions.  COMMON BRAND NAME(S): Bisac-Evac, Biscolax, Dulcolax  What should I tell my health care provider before I take this medicine?  They need to know if you have any of these conditions  -appendicitis  -persistent constipation  -stomach pain or blockage  -ulcerative colitis or other bowel disease  -an unusual or allergic reaction to bisacodyl, other medicines, foods, dyes, or preservatives  -pregnant or trying to get pregnant  How should I use this medicine?  This medicine is for rectal use only. Do not take by mouth. Wash your hands before and after use. Take off the foil wrapping. Wet the tip of the suppository with cold tap water to make it easier to use. Lie on your side and raise your knee to your chest. Using your finger push the suppository, with the pointed end first, into the rectum. Try and keep the suppository in your rectum for 15 to 20 minutes. If you feel it must come out at once, it was not inserted high enough and should be pushed higher. Do not use this medicine more often than directed by your doctor or health care professional.  Talk to your pediatrician regarding the use of this medicine in children. While this medicine may be used in children as young as 6 years for selected conditions, precautions do apply.  Overdosage: If you think you have taken too much of this medicine contact a poison control center or emergency room at once.  NOTE: This medicine is only for you. Do not share this medicine with others.  What if I miss a dose?  This does not  apply. This medicine is not for regular use, and should only be used as needed.  What may interact with this medicine?  Interactions are not expected.  This list may not describe all possible interactions. Give your health care provider a list of all the medicines, herbs, non-prescription drugs, or dietary supplements you use. Also tell them if you smoke, drink alcohol, or use illegal drugs. Some items may interact with your medicine.  What should I watch for while using this medicine?  Do not use this medicine for longer than directed by your doctor or health care professional. This medicine can be habit-forming. Long-term use can make your body depend on the laxative for regular bowel movements, damage the bowel, cause malnutrition, and problems with the amounts of water and salts in your body. If your constipation keeps returning, check with your doctor or health care professional.  If the suppository seems soft, hold it inside the  under cold water for one or two minutes.  If you do not have a bowel movement within 12 hours after using this medicine or you experience rectal bleeding, contact your doctor or health care professional. These may be signs of a more serious condition.  What side effects may I notice from receiving this medicine?  Side effects that you should report to your doctor or health care professional as soon as possible:  -diarrhea  -muscle weakness  -nausea, vomiting  -unusual weight loss  Side effects that usually do not require medical attention (report to your doctor or health care professional if they continue or are bothersome):  -bloating  -discolored urine  -lower stomach discomfort or cramps  -rectal itching, burning, or swelling  This list may not describe all possible side effects. Call your doctor for medical advice about side effects. You may report side effects to FDA at 8-411-FDA-7332.  Where should I keep my medicine?  Keep out of the reach of children.  Store at  room temperature below 25 degrees C (77 degrees F). Throw away any unused medicine after the expiration date.  NOTE: This sheet is a summary. It may not cover all possible information. If you have questions about this medicine, talk to your doctor, pharmacist, or health care provider.  © 2018 [employer] (2009-03-19 12:58:20)      Cyclobenzaprine tablets  What is this medicine?  CYCLOBENZAPRINE (sye kloe CARISSA za preen) is a muscle relaxer. It is used to treat muscle pain, spasms, and stiffness.  This medicine may be used for other purposes; ask your health care provider or pharmacist if you have questions.  COMMON BRAND NAME(S): Fexmid, Flexeril  What should I tell my health care provider before I take this medicine?  They need to know if you have any of these conditions:  -heart disease, irregular heartbeat, or previous heart attack  -liver disease  -thyroid problem  -an unusual or allergic reaction to cyclobenzaprine, tricyclic antidepressants, lactose, other medicines, foods, dyes, or preservatives  -pregnant or trying to get pregnant  -breast-feeding  How should I use this medicine?  Take this medicine by mouth with a glass of water. Follow the directions on the prescription label. If this medicine upsets your stomach, take it with food or milk. Take your medicine at regular intervals. Do not take it more often than directed.  Talk to your pediatrician regarding the use of this medicine in children. Special care may be needed.  Overdosage: If you think you have taken too much of this medicine contact a poison control center or emergency room at once.  NOTE: This medicine is only for you. Do not share this medicine with others.  What if I miss a dose?  If you miss a dose, take it as soon as you can. If it is almost time for your next dose, take only that dose. Do not take double or extra doses.  What may interact with this medicine?  Do not take this medicine with any of the following  medications:  -certain medicines for fungal infections like fluconazole, itraconazole, ketoconazole, posaconazole, voriconazole  -cisapride  -dofetilide  -dronedarone  -halofantrine  -levomethadyl  -MAOIs like Carbex, Eldepryl, Marplan, Nardil, and Parnate  -narcotic medicines for cough  -pimozide  -thioridazine  -ziprasidone  This medicine may also interact with the following medications:  -alcohol  -antihistamines for allergy, cough and cold  -certain medicines for anxiety or sleep  -certain medicines for cancer  -certain medicines for depression like amitriptyline, fluoxetine, sertraline  -certain medicines for infection like alfuzosin, chloroquine, clarithromycin, levofloxacin, mefloquine, pentamidine, troleandomycin  -certain medicines for irregular heart beat  -certain medicines for seizures like phenobarbital, primidone  -contrast dyes  -general anesthetics like halothane, isoflurane, methoxyflurane, propofol  -local anesthetics like lidocaine, pramoxine, tetracaine  -medicines that relax muscles for surgery  -narcotic medicines for pain  -other medicines that prolong the QT interval (cause an abnormal heart rhythm)  -phenothiazines like chlorpromazine, mesoridazine, prochlorperazine  This list may not describe all possible interactions. Give your health care provider a list of all the medicines, herbs, non-prescription drugs, or dietary supplements you use. Also tell them if you smoke, drink alcohol, or use illegal drugs. Some items may interact with your medicine.  What should I watch for while using this medicine?  Tell your doctor or health care professional if your symptoms do not start to get better or if they get worse.  You may get drowsy or dizzy. Do not drive, use machinery, or do anything that needs mental alertness until you know how this medicine affects you. Do not stand or sit up quickly, especially if you are an older patient. This reduces the risk of dizzy or fainting spells. Alcohol may  interfere with the effect of this medicine. Avoid alcoholic drinks.  If you are taking another medicine that also causes drowsiness, you may have more side effects. Give your health care provider a list of all medicines you use. Your doctor will tell you how much medicine to take. Do not take more medicine than directed. Call emergency for help if you have problems breathing or unusual sleepiness.  Your mouth may get dry. Chewing sugarless gum or sucking hard candy, and drinking plenty of water may help. Contact your doctor if the problem does not go away or is severe.  What side effects may I notice from receiving this medicine?  Side effects that you should report to your doctor or health care professional as soon as possible:  -allergic reactions like skin rash, itching or hives, swelling of the face, lips, or tongue  -breathing problems  -chest pain  -fast, irregular heartbeat  -hallucinations  -seizures  -unusually weak or tired  Side effects that usually do not require medical attention (report to your doctor or health care professional if they continue or are bothersome):  -headache  -nausea, vomiting  This list may not describe all possible side effects. Call your doctor for medical advice about side effects. You may report side effects to FDA at 5-256-FDA-6734.  Where should I keep my medicine?  Keep out of the reach of children.  Store at room temperature between 15 and 30 degrees C (59 and 86 degrees F). Keep container tightly closed. Throw away any unused medicine after the expiration date.  NOTE: This sheet is a summary. It may not cover all possible information. If you have questions about this medicine, talk to your doctor, pharmacist, or health care provider.  © 2018 [employer] Standard (2016-09-27 12:05:46)

## 2020-06-01 NOTE — PROGRESS NOTES
Neurosurgery Progress Note    Subjective:  Wants to go home, pain controlled, legs fine, amb, voiding, eating, bm yest      Exam:  Strength 5/5 BLE  Inc c/d w/ staples    BP  Min: 115/73  Max: 147/91  Pulse  Av.8  Min: 63  Max: 81  Resp  Av.8  Min: 16  Max: 18  Temp  Av.4 °C (97.5 °F)  Min: 36.1 °C (97 °F)  Max: 36.6 °C (97.8 °F)  SpO2  Av.3 %  Min: 95 %  Max: 98 %    No data recorded                      Intake/Output       20 - 20 0659 20 - 20 0659       Total 1900-0659 Total       Intake    P.O.  480  360 840  --  -- --    P.O. 480 360 840 -- -- --    Total Intake 480 360 840 -- -- --       Output    Urine  --  -- --  --  -- --    Number of Times Voided -- 2 x 2 x -- -- --    Total Output -- -- -- -- -- --       Net I/O     480 360 840 -- -- --            Intake/Output Summary (Last 24 hours) at 2020 0731  Last data filed at 2020 2200  Gross per 24 hour   Intake 840 ml   Output --   Net 840 ml            • famotidine  20 mg BID   • HYDROcodone-acetaminophen  1 Tab Q4HRS PRN   • tizanidine  4 mg Q8HRS   • cyclobenzaprine  10 mg TID PRN   • diphenhydrAMINE  25-50 mg QHS PRN   • loratadine  10 mg DAILY   • therapeutic multivitamin-minerals  1 Tab DAILY   • Pharmacy Consult Request  1 Each PHARMACY TO DOSE   • MD ALERT...DO NOT ADMINISTER NSAIDS or ASPIRIN unless ORDERED By Neurosurgery  1 Each PRN   • docusate sodium  100 mg BID   • senna-docusate  1 Tab Nightly   • senna-docusate  1 Tab Q24HRS PRN   • polyethylene glycol/lytes  1 Packet BID PRN   • magnesium hydroxide  30 mL QDAY PRN   • bisacodyl  10 mg Q24HRS PRN   • fleet  1 Each Once PRN   • 0.9 % NaCl with KCl 20 mEq 1,000 mL   Continuous   • HYDROmorphone  0.5 mg Once PRN   • diphenhydrAMINE  25 mg Q6HRS PRN    Or   • diphenhydrAMINE  25 mg Q6HRS PRN   • ondansetron  4 mg Q4HRS PRN   • ondansetron  4 mg Q4HRS PRN   • promethazine  12.5-25 mg Q4HRS PRN   • promethazine   12.5-25 mg Q4HRS PRN   • prochlorperazine  5-10 mg Q4HRS PRN   • labetalol  10 mg Q HOUR PRN   • hydrALAZINE  10 mg Q HOUR PRN       Assessment and Plan:    POD# 6 L5-S1 TLIF, L2-3 disc  Chemical prophylactic DVT therapy: No  Start date/time: n/a    Doing well  Awaiting HH and DME arrangements- insurance verfication  Pain control- on orals-- under care of Dr Guerra for pain management and has pain meds at home- has norco 5/325 & tizanidine  amb w/ LSO on when oob > 5 min/PT/OT  I explained I want him to amb as much as possible, therefore I do not want to provide a handicap placard  Discharge    ATTENDING ADDENDUM:  Patient seen independently and agree with above note  Ambulating comfortably in his room

## 2020-06-01 NOTE — THERAPY
Occupational Therapy  Daily Treatment     Patient Name: Devaughn Amador  Age:  54 y.o., Sex:  male  Medical Record #: [id number]  Today's Date: 6/1/2020     Precautions  Precautions: Fall Risk, Spinal / Back Precautions , Lumbosacral Orthosis  Comments: LSO OOB    Assessment    Pt seen for OT session. Progressing with functionally mobility and activity tolerance. Pt tangential and verbose req max v/cs for redirection. Continues to be limited by decreased functional mobility, activity tolerance, cognition, balance, and pain which are currently affecting pt's ability to complete ADLs/IADLs at baseline.       Plan    Continue current treatment plan.    Discharge recommendations:  Recommend home health for continued occupational therapy services as long as friends able to assist PRN.       Objective       06/01/20 1021   Pain 0 - 10 Group   Location Back   Location Orientation Mid;Lower   Therapist Pain Assessment Post Activity Pain Same as Prior to Activity;During Activity;Nurse Notified  (no c/o pain)   Cognition    Cognition / Consciousness X   Level of Consciousness Alert   Attention Impaired   Comments pleasant and cooperative, but tangential and verbose req redirection   Other Treatments   Other Treatments Provided Cont edu on spinal precautions and adaptive techniques for ADLs   Balance   Sitting Balance (Static) Good   Sitting Balance (Dynamic) Fair +   Standing Balance (Static) Fair   Standing Balance (Dynamic) Fair   Weight Shift Sitting Good   Weight Shift Standing Fair   Skilled Intervention Compensatory Strategies;Verbal Cuing   Comments w/o use of FWW declined; req furniture/walls. No LOB   Bed Mobility    Scooting Supervised   Skilled Intervention Verbal Cuing;Compensatory Strategies   Comments found and left in chair   Activities of Daily Living   Upper Body Dressing Minimal Assist  (LSO, good verbalizing of steps for don/doff)   Lower Body Dressing Moderate Assist   Toileting Supervision   Skilled  Intervention Verbal Cuing;Tactile Cuing;Compensatory Strategies;Facilitation   Comments reports has friends who can assist PRN   Functional Mobility   Sit to Stand Supervised   Bed, Chair, Wheelchair Transfer Supervised   Toilet Transfers Supervised   Transfer Method Stand Step   Mobility w/o AD; recommended use of sink for stabilization   Skilled Intervention Verbal Cuing;Compensatory Strategies   Activity Tolerance   Sitting in Chair 30+ min found and left in chair   Standing 5 min   Comments limited by pain/fatigue   Short Term Goals   Short Term Goal # 1 pt will complete LB dressing with spv   Goal Outcome # 1 Goal not met   Short Term Goal # 2 pt will complete ADL txfs with spv   Goal Outcome # 2 Goal met   Short Term Goal # 3 pt will don/doff LSO brace with spv   Goal Outcome # 3 Progressing as expected   Short Term Goal # 4 will complete tub txf with SPV   Anticipated Discharge Equipment   DC Equipment Tub / Shower Seat;Front-Wheel Walker

## 2020-06-01 NOTE — DISCHARGE PLANNING
Received Choice form at 0309  Agency/Facility Name: [facility]  Referral sent per Choice form @ 1076

## 2020-06-01 NOTE — DISCHARGE PLANNING
ATTN: Case Management  RE: Referral for Home Health    Reason for referral denial: HH referral to to [facility]               Unfortunately, we are not able to accept this referral for the reason listed above. If further clarity is needed, our Transitional Care Specialists are available to discuss any barriers to service at [phone].      We look forward to collaborating with you in the future,  [facility]

## 2020-06-02 NOTE — DISCHARGE SUMMARY
DATE OF ADMISSION:  05/26/2020    DATE OF DISCHARGE:  06/01/2020    ADDENDUM:  Please refer to the previously dictated discharge summary for   details up through 05/30/2020.    The patient remained in the hospital pending home health arrangements on   06/01/2020.  He is doing well.  His pain is controlled.  He has no leg   symptoms.  His strength is 5/5.  He is ambulatory, voiding, eating.  He had a   bowel movement yesterday.  His incision is clean, dry, flat with staples.  He   will be discharged home today with home health and a walker.  He requested a   hospital bed, but this was denied.  He also requested a handicap placard.  I   explained that we want him walking as much as possible, and therefore do not   want him to have a handicap placard.    DISCHARGE INSTRUCTIONS:  Provided on discharge.    DISCHARGE MEDICATIONS:  None.  The patient has Norco and tizanidine at home   and does not need a prescription.    IMPRESSION AND PLAN:  1.  Admitting diagnosis:  Left L2-3 herniated disk and L5-S1 spondylolisthesis   with motor and sensory radiculopathy recalcitrant to nonoperative measures.  2.  Operation performed:  Left L2-3 microdiskectomy and via separate incision,   L5-S1 TLIF with Dr. Oscar on 05/26/2020.  3.  The patient is discharged in stable medical condition with the   instructions and medications as outlined above.       ____________________________________     LISANDRO MARTINEZ / JOHN    DD:  06/01/2020 07:37:25  DT:  06/01/2020 22:49:15    D#:  [id number]  Job#:  [id number]

## 2020-06-03 NOTE — DISCHARGE PLANNING
Received call from [place] Ybarra yesterday inquiring about HH.[facility] call [facility], they state they received referral but no orders. Orders faxed to . Spoke to Tess at [facility] this am. She states orders received and case assigned to Willie  [phone], she is working on assigning provider. Will F/U later today.

## 2020-06-05 ENCOUNTER — HOME CARE VISIT (OUTPATIENT)
Dept: HOME HEALTH SERVICES | Facility: HOME HEALTHCARE | Age: 55
End: 2020-06-05
Payer: COMMERCIAL

## 2020-06-05 ENCOUNTER — DOCUMENTATION (OUTPATIENT)
Dept: MEDICAL GROUP | Facility: PHYSICIAN GROUP | Age: 55
End: 2020-06-05

## 2020-06-05 VITALS
BODY MASS INDEX: 28.4 KG/M2 | WEIGHT: 198.4 LBS | RESPIRATION RATE: 14 BRPM | HEIGHT: 70 IN | SYSTOLIC BLOOD PRESSURE: 130 MMHG | OXYGEN SATURATION: 98 % | DIASTOLIC BLOOD PRESSURE: 82 MMHG | HEART RATE: 79 BPM | TEMPERATURE: 97.4 F

## 2020-06-05 PROCEDURE — G0493 RN CARE EA 15 MIN HH/HOSPICE: HCPCS

## 2020-06-05 PROCEDURE — 665001 SOC-HOME HEALTH

## 2020-06-05 SDOH — ECONOMIC STABILITY: HOUSING INSECURITY
HOME SAFETY: SN EDUCATED PT IN REGARDS TO KEEPING CLEAR PATHWAYS IN HOME FOR AMBULATION, AND IMPORTANCE OF SMOKE DETECTORS IN HOME FOR SAFETY REASONS. PT STATES HE WILL HAVE HIS NEIGHBOR, PIERRE PUT A NEW BATTERY IN SMOKE DETECTOR AND REPLACE SOON". "

## 2020-06-05 ASSESSMENT — ACTIVITIES OF DAILY LIVING (ADL)
TRANSPORTATION COMMENTS: PT NEEDS ASSISTANCE TO LEAVE HOME.
OASIS_M1830: 02

## 2020-06-05 ASSESSMENT — ENCOUNTER SYMPTOMS
SHORTNESS OF BREATH: T
NAUSEA: PT DENIES
VOMITING: PT DENIES

## 2020-06-05 ASSESSMENT — PATIENT HEALTH QUESTIONNAIRE - PHQ9
1. LITTLE INTEREST OR PLEASURE IN DOING THINGS: 00
2. FEELING DOWN, DEPRESSED, IRRITABLE, OR HOPELESS: 00
CLINICAL INTERPRETATION OF PHQ2 SCORE: 0

## 2020-06-05 NOTE — PROGRESS NOTES
[facility]    Received referral from [facility] to review patient's medication list.    Med list reviewed and reconciled.  No clinically significant DDI identified.  Allergies reviewed.    Holli Poole, CassiusD

## 2020-06-08 ENCOUNTER — HOME CARE VISIT (OUTPATIENT)
Dept: HOME HEALTH SERVICES | Facility: HOME HEALTHCARE | Age: 55
End: 2020-06-08
Payer: COMMERCIAL

## 2020-06-08 VITALS
DIASTOLIC BLOOD PRESSURE: 80 MMHG | TEMPERATURE: 98.5 F | SYSTOLIC BLOOD PRESSURE: 120 MMHG | OXYGEN SATURATION: 99 % | HEART RATE: 66 BPM | RESPIRATION RATE: 16 BRPM

## 2020-06-08 VITALS
HEART RATE: 66 BPM | SYSTOLIC BLOOD PRESSURE: 124 MMHG | RESPIRATION RATE: 18 BRPM | TEMPERATURE: 98.5 F | DIASTOLIC BLOOD PRESSURE: 90 MMHG | OXYGEN SATURATION: 99 %

## 2020-06-08 PROCEDURE — G0493 RN CARE EA 15 MIN HH/HOSPICE: HCPCS

## 2020-06-08 PROCEDURE — G0152 HHCP-SERV OF OT,EA 15 MIN: HCPCS

## 2020-06-08 ASSESSMENT — ACTIVITIES OF DAILY LIVING (ADL)
USING THE TELPHONE: INDEPENDENT
PREPARING MEALS: NEEDS ASSISTANCE
ORAL_CARE_CURRENT_FUNCTION: INDEPENDENT
AMBULATION ASSISTANCE: INDEPENDENT
GROOMING_CURRENT_FUNCTION: INDEPENDENT
TOILETING: INDEPENDENT
TELEPHONE USE ASSESSED: 1
TRANSPORTATION ASSESSED: 1
BATHING ASSESSED: 1
FEEDING: INDEPENDENT
DRESSING_LB_CURRENT_FUNCTION: MODERATE ASSIST
AMBULATION ASSISTANCE: 1
LAUNDRY ASSESSED: 1
FEEDING ASSESSED: 1
TOILETING: 1
LAUNDRY: NEEDS ASSISTANCE
ORAL_CARE_ASSESSED: 1
GROOMING ASSESSED: 1
TRANSPORTATION: DEPENDENT
BATHING_CURRENT_FUNCTION: INDEPENDENT
LIGHT HOUSEKEEPING: NEEDS ASSISTANCE
HOUSEKEEPING ASSESSED: 1
SHOPPING ASSESSED: 1
DRESSING_UB_CURRENT_FUNCTION: INDEPENDENT
SHOPPING: NEEDS ASSISTANCE

## 2020-06-08 ASSESSMENT — ENCOUNTER SYMPTOMS
VOMITING: DENIES
DEBILITATING PAIN: 1
MUSCLE WEAKNESS: 1
NAUSEA: DENIES
POOR JUDGMENT: 1

## 2020-06-09 ENCOUNTER — HOME CARE VISIT (OUTPATIENT)
Dept: HOME HEALTH SERVICES | Facility: HOME HEALTHCARE | Age: 55
End: 2020-06-09
Payer: COMMERCIAL

## 2020-06-09 VITALS
OXYGEN SATURATION: 98 % | SYSTOLIC BLOOD PRESSURE: 128 MMHG | RESPIRATION RATE: 18 BRPM | DIASTOLIC BLOOD PRESSURE: 80 MMHG | HEART RATE: 88 BPM | TEMPERATURE: 98.6 F

## 2020-06-09 PROCEDURE — G0151 HHCP-SERV OF PT,EA 15 MIN: HCPCS

## 2020-06-09 SDOH — ECONOMIC STABILITY: HOUSING INSECURITY
HOME SAFETY: SOMEWHAT CLUTTERED LIVING SPACE BUT CLEAR PATHWAYS.  PATIENT EDUCATED ON IMPORTANCE OF CLEAR PATHWAYS.  PATIENT ABLE TO SPEAKBACK VERBAL UNDERSTANDING.

## 2020-06-09 ASSESSMENT — ACTIVITIES OF DAILY LIVING (ADL)
AMBULATION ASSISTANCE ON FLAT SURFACES: 1
AMBULATION ASSISTANCE ON UNEVEN SURFACES: 1
AMBULATION_DISTANCE/DURATION_TOLERATED: 800 FEET

## 2020-06-10 ENCOUNTER — HOME CARE VISIT (OUTPATIENT)
Dept: HOME HEALTH SERVICES | Facility: HOME HEALTHCARE | Age: 55
End: 2020-06-10
Payer: COMMERCIAL

## 2020-06-10 VITALS
DIASTOLIC BLOOD PRESSURE: 84 MMHG | OXYGEN SATURATION: 96 % | SYSTOLIC BLOOD PRESSURE: 120 MMHG | HEART RATE: 80 BPM | TEMPERATURE: 97.7 F | RESPIRATION RATE: 18 BRPM

## 2020-06-10 PROCEDURE — G0152 HHCP-SERV OF OT,EA 15 MIN: HCPCS

## 2020-06-10 ASSESSMENT — ENCOUNTER SYMPTOMS: DEBILITATING PAIN: 1

## 2020-06-12 ENCOUNTER — HOME CARE VISIT (OUTPATIENT)
Dept: HOME HEALTH SERVICES | Facility: HOME HEALTHCARE | Age: 55
End: 2020-06-12
Payer: COMMERCIAL

## 2020-06-12 VITALS
TEMPERATURE: 98.7 F | OXYGEN SATURATION: 98 % | SYSTOLIC BLOOD PRESSURE: 110 MMHG | RESPIRATION RATE: 16 BRPM | DIASTOLIC BLOOD PRESSURE: 80 MMHG | HEART RATE: 85 BPM

## 2020-06-12 PROCEDURE — G0493 RN CARE EA 15 MIN HH/HOSPICE: HCPCS

## 2020-06-12 ASSESSMENT — ENCOUNTER SYMPTOMS
NAUSEA: DENIES
VOMITING: DENIES
MUSCLE WEAKNESS: 1

## 2020-06-15 ENCOUNTER — HOME CARE VISIT (OUTPATIENT)
Dept: HOME HEALTH SERVICES | Facility: HOME HEALTHCARE | Age: 55
End: 2020-06-15
Payer: COMMERCIAL

## 2020-06-15 VITALS
HEART RATE: 85 BPM | DIASTOLIC BLOOD PRESSURE: 80 MMHG | SYSTOLIC BLOOD PRESSURE: 120 MMHG | TEMPERATURE: 97.9 F | RESPIRATION RATE: 16 BRPM | OXYGEN SATURATION: 97 %

## 2020-06-15 PROCEDURE — G0493 RN CARE EA 15 MIN HH/HOSPICE: HCPCS

## 2020-06-15 PROCEDURE — G0152 HHCP-SERV OF OT,EA 15 MIN: HCPCS

## 2020-06-15 ASSESSMENT — ACTIVITIES OF DAILY LIVING (ADL)
HOUSEKEEPING ASSESSED: 1
PREPARING MEALS: INDEPENDENT
GROOMING ASSESSED: 1
SHOPPING: NEEDS ASSISTANCE
AMBULATION ASSISTANCE: 1
ORAL_CARE_ASSESSED: 1
LIGHT HOUSEKEEPING: NEEDS ASSISTANCE
TELEPHONE USE ASSESSED: 1
BATHING ASSESSED: 1
BATHING_CURRENT_FUNCTION: INDEPENDENT
DRESSING_UB_CURRENT_FUNCTION: INDEPENDENT
TRANSPORTATION: DEPENDENT
USING THE TELPHONE: INDEPENDENT
DRESSING_LB_CURRENT_FUNCTION: INDEPENDENT
AMBULATION ASSISTANCE: INDEPENDENT
ORAL_CARE_CURRENT_FUNCTION: INDEPENDENT
LAUNDRY ASSESSED: 1
TRANSPORTATION ASSESSED: 1
TOILETING: 1
GROOMING_CURRENT_FUNCTION: INDEPENDENT
FEEDING ASSESSED: 1
SHOPPING ASSESSED: 1
FEEDING: INDEPENDENT
LAUNDRY: NEEDS ASSISTANCE
TOILETING: INDEPENDENT

## 2020-06-15 ASSESSMENT — ENCOUNTER SYMPTOMS
VOMITING: DENIES
DEBILITATING PAIN: 1
MUSCLE WEAKNESS: 1
ADDITIONAL INFORMATION: 4/10
NAUSEA: DENIES

## 2020-06-18 ENCOUNTER — HOME CARE VISIT (OUTPATIENT)
Dept: HOME HEALTH SERVICES | Facility: HOME HEALTHCARE | Age: 55
End: 2020-06-18
Payer: COMMERCIAL

## 2020-06-18 VITALS
RESPIRATION RATE: 16 BRPM | SYSTOLIC BLOOD PRESSURE: 120 MMHG | OXYGEN SATURATION: 98 % | DIASTOLIC BLOOD PRESSURE: 80 MMHG | TEMPERATURE: 98.5 F | HEART RATE: 86 BPM

## 2020-06-18 PROCEDURE — G0493 RN CARE EA 15 MIN HH/HOSPICE: HCPCS

## 2020-06-18 ASSESSMENT — ENCOUNTER SYMPTOMS
NAUSEA: DENIES
MUSCLE WEAKNESS: 1
VOMITING: DENIES
ADDITIONAL INFORMATION: 4/10

## 2020-06-25 ENCOUNTER — HOME CARE VISIT (OUTPATIENT)
Dept: HOME HEALTH SERVICES | Facility: HOME HEALTHCARE | Age: 55
End: 2020-06-25
Payer: COMMERCIAL

## 2020-06-25 VITALS
TEMPERATURE: 98.6 F | OXYGEN SATURATION: 98 % | DIASTOLIC BLOOD PRESSURE: 65 MMHG | SYSTOLIC BLOOD PRESSURE: 100 MMHG | HEART RATE: 82 BPM | RESPIRATION RATE: 16 BRPM

## 2020-06-25 PROCEDURE — G0493 RN CARE EA 15 MIN HH/HOSPICE: HCPCS

## 2020-06-25 ASSESSMENT — ENCOUNTER SYMPTOMS
VOMITING: DENIES
NAUSEA: DENIES
MUSCLE WEAKNESS: 1
ADDITIONAL INFORMATION: 4/10

## 2020-07-01 ENCOUNTER — HOME CARE VISIT (OUTPATIENT)
Dept: HOME HEALTH SERVICES | Facility: HOME HEALTHCARE | Age: 55
End: 2020-07-01
Payer: COMMERCIAL

## 2020-07-01 VITALS
TEMPERATURE: 97.6 F | SYSTOLIC BLOOD PRESSURE: 118 MMHG | HEART RATE: 76 BPM | OXYGEN SATURATION: 95 % | RESPIRATION RATE: 16 BRPM | DIASTOLIC BLOOD PRESSURE: 76 MMHG

## 2020-07-01 PROCEDURE — G0299 HHS/HOSPICE OF RN EA 15 MIN: HCPCS

## 2020-07-01 ASSESSMENT — ENCOUNTER SYMPTOMS
NAUSEA: DENIES
MUSCLE WEAKNESS: 1
LIMITED RANGE OF MOTION: 1
VOMITING: DENIES

## 2020-07-01 NOTE — PROGRESS NOTES
"ATTENTION NEEDED  Dr. Oscar,    This home health nurse visited patient on 7/1/20. Pt reports that he bent down to grab a can of beer on 6/26. Tells this nurse that his neighbor was visiting and he \"knocked the can over\" by accident and he promptly bent over to catch the falling beer can. He said he felt immediate sharp pain in his left hip and left thigh but had pain in those locations before so it's not new to him. Pt states that Norc o has provided good relief and his pain has been decreasing. He reports 2/10 pain to left hip and thigh at this time of assessment. This nurse instructed pt to have further evaluation at ED and offered to schedule earlier visit with surgeon d/t recent spine surgery, but pt refused. He tells this nurse that his pain is improving so no need for the above interventions. Back incision approximated, healed, remains intact. Pt denies any cons umption of alcohol, denies any slips, trips or falls. He is aware of risk of consuming alcohol which can lead to fall and has potential interaction with his medications, and able to verbalized spinal precaution including no bending or twisting. Instructed pt to seek immediately medial attention if pain worsens or new s/sx develop. Pt voiced understanding and able to location  24/7 contact phone number.     Thank you,  PETER Scott ([phone])  "

## 2020-07-01 NOTE — Clinical Note
"Thank you Sonia so much for looking after our patients while i was out.   ----- Message -----  From: Temo Mendoza R.N.  Sent: 7/1/2020   1:22 PM PDT  To: Georgette Melton R.N.      Pt reports that he bent down to grab a can of beer on 6/26. Tells this nurse that his neighbor was visiting and he \"knocked the can over\" by accident and he promptly bent over to catch the falling beer can. He said he felt immediate sharp pain in his left hip and left thigh but had pain in those locations before so it's not new to him. Pt states that Norco has provided good relief and his pain has been decreasing. He reports 2/10 pain to left hip and thigh at this time of assessment. This nurse instructed pt to have further evaluation at ED and offered to schedule earlier visit with surgeon d/t recent spine surgery, but pt refused. He tells this nurse that his pain is improving so no need for the above interventions. Back incision approximated, healed, remains intact. Pt denies any consumption of alcohol, denies any slips, trips or falls. He is aware of risk of consuming alcohol which can lead to fall and has potential interaction with his medications, and able to verbalized spinal precaution including no bending or twisting. Instructed pt to seek immediately medial attention if pain worsens or new s/sx develop. Pt voiced understanding and able to location  24/7 contact phone number.   "

## 2020-07-01 NOTE — PROGRESS NOTES
"Pt reports that he bent down to grab a can of beer on 6/26. Tells this nurse that his neighbor was visiting and he \"knocked the can over\" by accident and he promptly bent over to catch the falling beer can. He said he felt immediate sharp pain in his left hip and left thigh but had pain in those locations before so it's not new to him. Pt states that Norco has provided good relief and his pain has been decreasing. He reports 2/10 pain t o left hip and thigh at this time of assessment. This nurse instructed pt to have further evaluation at ED and offered to schedule earlier visit with surgeon d/t recent spine surgery, but pt refused. He tells this nurse that his pain is improving so no need for the above interventions. Back incision approximated, healed, remains intact. Pt denies any consumption of alcohol, denies any slips, trips or falls. He is aware of risk of consum ing alcohol which can lead to fall and has potential interaction with his medications, and able to verbalized spinal precaution including no bending or twisting. Instructed pt to seek immediately medial attention if pain worsens or new s/sx develop. Pt voiced understanding and able to location  24/7 contact phone number.   "

## 2020-07-08 ENCOUNTER — HOME CARE VISIT (OUTPATIENT)
Dept: HOME HEALTH SERVICES | Facility: HOME HEALTHCARE | Age: 55
End: 2020-07-08
Payer: COMMERCIAL

## 2020-07-08 VITALS
TEMPERATURE: 97.7 F | DIASTOLIC BLOOD PRESSURE: 70 MMHG | SYSTOLIC BLOOD PRESSURE: 124 MMHG | OXYGEN SATURATION: 16 % | RESPIRATION RATE: 16 BRPM | HEART RATE: 86 BPM

## 2020-07-08 PROCEDURE — G0299 HHS/HOSPICE OF RN EA 15 MIN: HCPCS

## 2020-07-08 PROCEDURE — 665001 SOC-HOME HEALTH

## 2020-07-08 ASSESSMENT — ENCOUNTER SYMPTOMS
VOMITING: DENIES
NAUSEA: DENIES
LIMITED RANGE OF MOTION: 1
MUSCLE WEAKNESS: 1

## 2020-07-09 NOTE — PROGRESS NOTES
" Falls Template         Date & Time of fall: 7/5/20 in the morning           Cause of fall:  Physiological           Location:  Kitchen           Was the fall witnessed?                  If yes, by who? No            Actions taken by patient:  Pt reports no injury fall on 7/5, states that he was \"squatting and putting leftover food away into the fridge and lost balance\". Reports that he fell back and no injury since he was in a squatt ing position and didn't hit his head. He was able to get up by himself right after the fall.             Any new injury?                   If yes, what kind?  No            Any recent medication changes?    No            Reviewed Post Fall Questionnaire:  Yes                 Post fall instructions:  change position slowly, pay attention to body position and balance, ask for help if needed.            Actions Taken: Notified care team ángel gotti Notified MD.    "

## 2020-07-15 ENCOUNTER — HOME CARE VISIT (OUTPATIENT)
Dept: HOME HEALTH SERVICES | Facility: HOME HEALTHCARE | Age: 55
End: 2020-07-15
Payer: COMMERCIAL

## 2020-07-15 VITALS
HEART RATE: 85 BPM | SYSTOLIC BLOOD PRESSURE: 124 MMHG | TEMPERATURE: 97.5 F | RESPIRATION RATE: 16 BRPM | OXYGEN SATURATION: 98 % | DIASTOLIC BLOOD PRESSURE: 82 MMHG

## 2020-07-15 PROCEDURE — G0495 RN CARE TRAIN/EDU IN HH: HCPCS

## 2020-07-15 ASSESSMENT — ENCOUNTER SYMPTOMS
NAUSEA: DENIES
VOMITING: DENIES
LIMITED RANGE OF MOTION: 1
MUSCLE WEAKNESS: 1

## 2020-07-20 ENCOUNTER — HOME CARE VISIT (OUTPATIENT)
Dept: HOME HEALTH SERVICES | Facility: HOME HEALTHCARE | Age: 55
End: 2020-07-20
Payer: COMMERCIAL

## 2020-07-20 VITALS
DIASTOLIC BLOOD PRESSURE: 78 MMHG | TEMPERATURE: 97.9 F | OXYGEN SATURATION: 98 % | HEART RATE: 92 BPM | SYSTOLIC BLOOD PRESSURE: 126 MMHG | RESPIRATION RATE: 16 BRPM

## 2020-07-20 PROCEDURE — G0493 RN CARE EA 15 MIN HH/HOSPICE: HCPCS

## 2020-07-20 ASSESSMENT — PATIENT HEALTH QUESTIONNAIRE - PHQ9: CLINICAL INTERPRETATION OF PHQ2 SCORE: 0

## 2020-07-20 ASSESSMENT — ACTIVITIES OF DAILY LIVING (ADL)
OASIS_M1830: 00
HOME_HEALTH_OASIS: 00

## 2020-07-20 NOTE — PROGRESS NOTES
Discharged  Patient is discharged from [facility] of 7/20/10. All goals met. Pt is no longer home-bound. Reviewed use of narcotic pain meds and driving safety as pt should not drive after taking any narcotics. Pt voiced understanding.

## 2020-11-25 ENCOUNTER — NON-PROVIDER VISIT (OUTPATIENT)
Dept: URGENT CARE | Facility: PHYSICIAN GROUP | Age: 55
End: 2020-11-25

## 2020-11-25 DIAGNOSIS — Z02.1 PRE-EMPLOYMENT DRUG SCREENING: ICD-10-CM

## 2020-11-25 PROCEDURE — 80305 DRUG TEST PRSMV DIR OPT OBS: CPT | Performed by: PHYSICIAN ASSISTANT

## 2020-12-22 ENCOUNTER — NON-PROVIDER VISIT (OUTPATIENT)
Dept: URGENT CARE | Facility: PHYSICIAN GROUP | Age: 55
End: 2020-12-22

## 2020-12-22 DIAGNOSIS — Z02.83 EMPLOYMENT-RELATED DRUG TESTING, ENCOUNTER FOR: Primary | ICD-10-CM

## 2020-12-22 DIAGNOSIS — Z02.1 PRE-EMPLOYMENT DRUG SCREENING: ICD-10-CM

## 2020-12-22 PROCEDURE — 80305 DRUG TEST PRSMV DIR OPT OBS: CPT | Performed by: PHYSICIAN ASSISTANT

## 2022-10-01 NOTE — PROGRESS NOTES
"Subjective:      Devaughn Amador is a 54 y.o. 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  He states he felt a sudden sharp and shooting pain in his lower back that radiates to mid back.      Today patient states slow pace improvement.  Patient cannot sit or stand for much long. Patient states that he been using heat with moderate relief. Patient is taking Ibuprofen with some relief. Patient states that muscle relaxer has been ineffective. Patient states that the gel has been ineffective. Patient has been trying to stretch his back to keep it moving, which provides mild relief. Pain is described as constant, migrating down to his leg, but not to the toes, feet, or ankle, and its stuck in the calf.  Patient denies saddle anesthesia, loss of bowel/bladder control, or tingling. Patient states that he feels better with more rest and is able to gauge how he is feeling and take breaks as needed. MRI and Physical Therapy were approved today. MRI scheduled for 11/19/19. Plan of care discussed with patient.       HPI    Review of Systems   Constitutional: Negative.    Respiratory: Negative.    Cardiovascular: Negative.    Musculoskeletal: Positive for back pain, joint pain and myalgias.   Skin: Negative.    Neurological: Positive for tingling, sensory change and weakness.   Psychiatric/Behavioral: Negative.         SOCHX: Works as a  at [employer]  FH: No pertinent family history to this problem.         Objective:     /82   Pulse 100   Temp 36.9 °C (98.4 °F)   Resp 18   Ht 1.727 m (5' 8\")   Wt 81.6 kg (180 lb)   SpO2 96%   BMI 27.37 kg/m²      Physical Exam  Constitutional:       General: He is not in acute distress.     Appearance: Normal appearance. He is not ill-appearing.   Cardiovascular:      Rate and Rhythm: Normal rate and regular rhythm. "   Pulmonary:      Effort: Pulmonary effort is normal.   Musculoskeletal:         General: Tenderness and signs of injury present. No swelling or deformity.      Lumbar back: He exhibits tenderness and pain. He exhibits normal range of motion, no deformity, no spasm and normal pulse.        Back:    Skin:     General: Skin is warm and dry.      Capillary Refill: Capillary refill takes less than 2 seconds.      Findings: No bruising or erythema.   Neurological:      General: No focal deficit present.      Mental Status: He is alert and oriented to person, place, and time.      Cranial Nerves: No cranial nerve deficit.      Sensory: No sensory deficit.      Motor: No weakness.      Coordination: Coordination normal.      Gait: Gait normal.      Deep Tendon Reflexes: Reflexes normal.   Psychiatric:         Mood and Affect: Mood normal.         Behavior: Behavior normal.         Lumbar:  Moderate bony tenderness over lumbar spine region with moderate paraspinous muscular tenderness and spasm bilateral lumbar region  No bony deformity or step-off noted no radiculopathy  Unable to perform straight leg test due to pain  Patient is able to ambulate with a cane  Improved ability getting up and down from sitting position  Distal neurovascular intact  Unable to heel/toe walk with minimal difficulty   Cranial nerves grossly intact   Achilles and patellar reflexes 2+ bilaterally       Assessment/Plan:       1. Lumbar strain, subsequent encounter  Follow-up in 1 week  Work restrictions applied   MRI scheduled 11/19/19  Continue with ice and heat as tolerated   Continue with OTC ibuprofen as needed   Continue with gentle range of motion and stretching exercises as tolerated   Physical therapy appointments pending scheduling         Isotretinoin Counseling: Patient should get monthly blood tests, not donate blood, not drive at night if vision affected, not share medication, and not undergo elective surgery for 6 months after tx completed. Side effects reviewed, pt to contact office should one occur.

## (undated) DEVICE — GLOVE BIOGEL SZ 7 SURGICAL PF LTX - (50PR/BX 4BX/CA)

## (undated) DEVICE — GLOVE BIOGEL SZ 8.5 SURGICAL PF LTX - (50PR/BX 4BX/CA)

## (undated) DEVICE — BLADE SURGICAL CLIPPER - (50EA/CA)

## (undated) DEVICE — SYRINGE SAFETY 10 ML 18 GA X 1 1/2 BLUNT LL (100/BX 4BX/CA)

## (undated) DEVICE — ARMREST CRADLE FOAM - (2PR/PK 12PR/CA)

## (undated) DEVICE — BIT DRILL SHORT W/QC 2.7MM (1TX2+1TX2=4)

## (undated) DEVICE — PROTECTOR ULNA NERVE - (36PR/CA)

## (undated) DEVICE — PACK JACKSON TABLE KIT W/OUT - HR (6EA/CA)

## (undated) DEVICE — BOVIE BLADE COATED &INSULATED - 25/PK

## (undated) DEVICE — BIT DRILL 2.7MM 130MM (2TX2=4)

## (undated) DEVICE — ELECTRODE DUAL RETURN W/ CORD - (50/PK)

## (undated) DEVICE — SUTURE 4-0 MONOCRYL PLUS PS-2 - 27 INCH (36/BX)

## (undated) DEVICE — SET EXTENSION WITH 2 PORTS (48EA/CA) ***PART #2C8610 IS A SUBSTITUTE*****

## (undated) DEVICE — CORDS BIPOLAR COAGULATION - 12FT STERILE DISP. (10EA/BX)

## (undated) DEVICE — SUTURE 0 VICRYL PLUS CT-2 - 8 X 18 INCH (12/BX)

## (undated) DEVICE — COVER MAYO STAND X-LG - (22EA/CA)

## (undated) DEVICE — HEADREST PRONEVIEW LARGE - (10/CA)

## (undated) DEVICE — LACTATED RINGERS INJ 1000 ML - (14EA/CA 60CA/PF)

## (undated) DEVICE — PEN SKIN MARKER W/RULER - (50EA/BX)

## (undated) DEVICE — BONE MILL BM210

## (undated) DEVICE — MASK ANESTHESIA ADULT  - (100/CA)

## (undated) DEVICE — SENSOR SPO2 NEO LNCS ADHESIVE (20/BX) SEE USER NOTES

## (undated) DEVICE — TUBING CLEARLINK DUO-VENT - C-FLO (48EA/CA)

## (undated) DEVICE — GOWN WARMING STANDARD FLEX - (30/CA)

## (undated) DEVICE — GLOVE BIOGEL PI ORTHO SZ 6 1/2 SURGICAL PF LF (40PR/BX)

## (undated) DEVICE — NEPTUNE 4 PORT MANIFOLD - (20/PK)

## (undated) DEVICE — CHLORAPREP 26 ML APPLICATOR - ORANGE TINT(25/CA)

## (undated) DEVICE — SODIUM CHL IRRIGATION 0.9% 1000ML (12EA/CA)

## (undated) DEVICE — TOOL DISSECT MATCH HEAD

## (undated) DEVICE — KIT SURGIFLO W/OUT THROMBIN - (6EA/CA)

## (undated) DEVICE — SUTURE GENERAL

## (undated) DEVICE — KIT EVACUATER 3 SPRING PVC LF 1/8 DRAIN SIZE (10EA/CA)"

## (undated) DEVICE — DRAPE STRLE REG TOWEL 18X24 - (10/BX 4BX/CA)"

## (undated) DEVICE — TRAY CATHETER FOLEY URINE METER W/STATLOCK 350ML (10EA/CA)

## (undated) DEVICE — KIT ANESTHESIA W/CIRCUIT & 3/LT BAG W/FILTER (20EA/CA)

## (undated) DEVICE — GLOVE BIOGEL INDICATOR SZ 6.5 SURGICAL PF LTX - (50PR/BX 4BX/CA)

## (undated) DEVICE — PACK NEURO - (2EA/CA)

## (undated) DEVICE — SUTURE 1 VICRYL PLUS CTX - 8 X 18 INCH (12/BX)

## (undated) DEVICE — MIDAS LUBRICATOR DIFFUSER PACK (4EA/CA)

## (undated) DEVICE — SET LEADWIRE 5 LEAD BEDSIDE DISPOSABLE ECG (1SET OF 5/EA)

## (undated) DEVICE — FORCEP BIPOLAR ISOCOOL 8.5 1.0MM TIP"

## (undated) DEVICE — GOWN SURGEONS X-LARGE - DISP. (30/CA)

## (undated) DEVICE — ELECTRODE 850 FOAM ADHESIVE - HYDROGEL RADIOTRNSPRNT (50/PK)

## (undated) DEVICE — BAG SPONGE COUNT 10.25 X 32 - BLUE (250/CA)

## (undated) DEVICE — SOD. CHL. INJ. 0.9% 1000 ML - (14EA/CA 60CA/PF)

## (undated) DEVICE — CANISTER SUCTION 3000ML MECHANICAL FILTER AUTO SHUTOFF MEDI-VAC NONSTERILE LF DISP  (40EA/CA)

## (undated) DEVICE — DRAPE LAPAROTOMY T SHEET - (12EA/CA)

## (undated) DEVICE — GOWN SURGICAL XX-LARGE - (28EA/CA) SIRUS NON REINFORCED

## (undated) DEVICE — SLEEVE, VASO, THIGH, MED

## (undated) DEVICE — SUCTION INSTRUMENT YANKAUER BULBOUS TIP W/O VENT (50EA/CA)